# Patient Record
Sex: FEMALE | Race: WHITE | NOT HISPANIC OR LATINO | Employment: UNEMPLOYED | URBAN - METROPOLITAN AREA
[De-identification: names, ages, dates, MRNs, and addresses within clinical notes are randomized per-mention and may not be internally consistent; named-entity substitution may affect disease eponyms.]

---

## 2018-01-09 NOTE — MISCELLANEOUS
Message   Recorded as Task   Date: 03/22/2016 09:06 AM, Created By: Sheree Mcclain   Task Name: Call Back   Assigned To: GREGORY GYN,Team   Regarding Patient: Phu Jameson, Status: In Progress   CommentMaire Draft - 22 Mar 2016 9:06 AM     TASK CREATED  Caller: Self; [phone] (Home); [phone] (Work)  pt thinks she has another infection was put on meds and it never went away does she need an apt at this time [address]  22 Mar 2016 9:36 AM     TASK IN PROGRESS   Tonie,Carroll - 22 Mar 2016 9:46 AM     TASK EDITED  dr Chante Nunez notified  - please advise   TonieCarroll - 22 Mar 2016 9:46 AM     TASK REASSIGNED: Previously Assigned To Janine Maze - 22 Mar 2016 10:32 AM     TASK REPLIED TO: Previously Assigned To Jamari Berry  Rx done for Cleocin Vaginal, tell patient   TonieCarroll - 22 Mar 2016 11:15 AM     TASK EDITED  called pt- informed of cleocin vag sup- pt to call pharm prior to picking up  Active Problems    1  Bacterial vaginosis (616 10,041 9) (N76 0,B96 89)   2  Vaginitis (616 10) (N76 0)    Current Meds   1  Cleocin 100 MG Vaginal Suppository; INSERT 1 SUPPOSITORY INTRAVAGINALLY AT   BEDTIME NIGHTLY; Therapy: [id number] to (Eli Villalobos)  Requested for: 22Mar2016; Last   Rx:22Mar2016 Ordered   2  MetroNIDAZOLE 500 MG Oral Tablet; sig 1 tab bid x 7 days; Therapy: [id number] to (Eli Villalobos)  Requested for: 88PNB1455; Last   Rx:01Mar2016 Ordered    Allergies    1   No Known Drug Allergies    Signatures   Electronically signed by : Konrad Salazar RN; Mar 22 2016 11:15AM EST                       (Author)

## 2018-01-10 NOTE — MISCELLANEOUS
Message   Recorded as Task   Date: 03/22/2016 02:40 PM, Created By: Valeri Hui   Task Name: Follow Up   Assigned To: Abhishek Heller   Regarding Patient: Deedee Tineo, Status: In Progress   Comment:    Valeri Hui - 22 Mar 2016 2:40 PM     TASK CREATED    pharm called the cleocin isnt covered by ins,will try a preauth first   Valeri Hui - 22 Mar 2016 2:47 PM     TASK EDITED  MEDICATION IS NOT ON PTS FORMULARY CAN WE TRY ANOTHER RX? Joan Castillone - 22 Mar 2016 2:54 PM     TASK IN PROGRESS   Merlyn Castillo - 22 Mar 2016 3:01 PM     TASK EDITED  pt  will call ins  to see what rx is covered    metronidazole made her feel sick and did not take away the symptoms   await pts callback and then send to vg for alternate rx   JonathanMerlyn - 22 Mar 2016 3:43 PM     TASK EDITED  pt spoke with ins    they will cover clindamycin   req new rx   JnoathanMerlyn - 22 Mar 2016 3:43 PM     TASK REASSIGNED: Previously Assigned To GREGORY GYN,Team  to vg for rx   Jamari Berry - 25 Mar 2016 2:35 PM     TASK REPLIED TO: Previously Assigned To Jamari Berry  Rx of Clindamycin was issued   Merlyn Castillo - 25 Mar 2016 2:40 PM     TASK EDITED  pt inf rx to ehr        Active Problems    1  Bacterial vaginosis (616 10,041 9) (N76 0,B96 89)   2  Vaginitis (616 10) (N76 0)    Current Meds   1  Cleocin 100 MG Vaginal Suppository; INSERT 1 SUPPOSITORY INTRAVAGINALLY AT   BEDTIME NIGHTLY; Therapy: [id number] to (Barrera Canseco)  Requested for: 22Mar2016; Last   Rx:22Mar2016 Ordered   2  Clindamycin Phosphate 2 % Vaginal Cream; INSERT 1 APPLICATORFUL   INTRAVAGINALLY AT BEDTIME NIGHTLY; Therapy: [id number] to (Reyna Boland)  Requested for: 25Mar2016; Last   Rx:25Mar2016 Ordered   3  MetroNIDAZOLE 500 MG Oral Tablet; sig 1 tab bid x 7 days; Therapy: 49ECX0190 to (Roxann Macias)  Requested for: 19XLU3458; Last   Rx:01Mar2016 Ordered    Allergies    1   No Known Drug Allergies    Signatures   Electronically signed by : Lizet Feldman, ; Mar 25 2016  2:40PM EST                       (Author)

## 2018-01-13 NOTE — MISCELLANEOUS
Message   Recorded as Task   Date: 03/22/2016 02:40 PM, Created By: Heydi Vasquez   Task Name: Follow Up   Assigned To: Opal Lopez   Regarding Patient: Saad Hardin, Status: In Progress   Comment:    Heydi Vasquez - 22 Mar 2016 2:40 PM     TASK CREATED    pharm called the cleocin isnt covered by ins,will try a preauth first   Heydi Vasquez - 22 Mar 2016 2:47 PM     TASK EDITED  MEDICATION IS NOT ON PTS FORMULARY CAN WE TRY ANOTHER RX? Merlyn Castillo - 22 Mar 2016 2:54 PM     TASK IN PROGRESS   Merlyn Castillo - 22 Mar 2016 3:01 PM     TASK EDITED  pt  will call ins  to see what rx is covered    metronidazole made her feel sick and did not take away the symptoms   await pts callback and then send to vg for alternate rx   JonathanMerlyn - 22 Mar 2016 3:43 PM     TASK EDITED  pt spoke with ins    they will cover clindamycin   req new rx        Active Problems    1  Bacterial vaginosis (616 10,041 9) (N76 0,B96 89)   2  Vaginitis (616 10) (N76 0)    Current Meds   1  Cleocin 100 MG Vaginal Suppository; INSERT 1 SUPPOSITORY INTRAVAGINALLY AT   BEDTIME NIGHTLY; Therapy: [id number] to (Becky Farley)  Requested for: 22Mar2016; Last   Rx:22Mar2016 Ordered   2  MetroNIDAZOLE 500 MG Oral Tablet; sig 1 tab bid x 7 days; Therapy: 34YEU3742 to (Neal Oro)  Requested for: 55ASJ3701; Last   Rx:01Mar2016 Ordered    Allergies    1   No Known Drug Allergies    Signatures   Electronically signed by : Jocelyn Sandhu, ; Mar 22 2016  3:43PM EST                       (Author)

## 2018-01-16 NOTE — MISCELLANEOUS
Message   Recorded as Task   Date: 03/22/2016 09:06 AM, Created By: Memo Taylor   Task Name: Call Back   Assigned To: GREGORY GYN,Team   Regarding Patient: Luiz Oneal, Status: In Progress   DenzelEdy Ish - 22 Mar 2016 9:06 AM     TASK CREATED  Caller: Self; [phone] (Home); [phone] (Work)  pt thinks she has another infection was put on meds and it never went away does she need an apt at this time [address]  22 Mar 2016 9:36 AM     TASK IN PROGRESS   TonieJan - 22 Mar 2016 9:46 AM     TASK EDITED  dr Soliz Other notified  - please advise        Active Problems    1  Bacterial vaginosis (616 10,041 9) (N76 0,B96 89)   2  Vaginitis (616 10) (N76 0)    Current Meds   1  MetroNIDAZOLE 500 MG Oral Tablet; sig 1 tab bid x 7 days; Therapy: 66HKT1633 to (Lucía Aguilar)  Requested for: 24HAT7813; Last   Rx:01Mar2016 Ordered    Allergies    1   No Known Drug Allergies    Signatures   Electronically signed by : Blanca Flores RN; Mar 22 2016  9:46AM EST                       (Author)

## 2024-05-06 ENCOUNTER — HOSPITAL ENCOUNTER (EMERGENCY)
Facility: HOSPITAL | Age: 39
Discharge: HOME | End: 2024-05-06
Attending: EMERGENCY MEDICINE
Payer: MEDICARE

## 2024-05-06 VITALS
HEART RATE: 96 BPM | OXYGEN SATURATION: 100 % | BODY MASS INDEX: 24.1 KG/M2 | TEMPERATURE: 98 F | RESPIRATION RATE: 18 BRPM | SYSTOLIC BLOOD PRESSURE: 128 MMHG | DIASTOLIC BLOOD PRESSURE: 95 MMHG | WEIGHT: 138.23 LBS

## 2024-05-06 DIAGNOSIS — R19.7 DIARRHEA: Primary | ICD-10-CM

## 2024-05-06 LAB
ALBUMIN SERPL BCP-MCNC: 4.5 G/DL (ref 3.5–5)
ALP SERPL-CCNC: 41 U/L (ref 34–104)
ALT SERPL W P-5'-P-CCNC: 9 U/L (ref 7–52)
ANION GAP SERPL CALCULATED.3IONS-SCNC: 8 MMOL/L (ref 4–13)
AST SERPL W P-5'-P-CCNC: 12 U/L (ref 13–39)
BACTERIA UR QL AUTO: ABNORMAL /HPF
BASOPHILS # BLD AUTO: 0.05 THOUSANDS/ÂΜL (ref 0–0.1)
BASOPHILS NFR BLD AUTO: 1 % (ref 0–1)
BILIRUB SERPL-MCNC: 0.36 MG/DL (ref 0.2–1)
BILIRUB UR QL STRIP: NEGATIVE
BUN SERPL-MCNC: 7 MG/DL (ref 5–25)
CALCIUM SERPL-MCNC: 9.4 MG/DL (ref 8.4–10.2)
CHLORIDE SERPL-SCNC: 104 MMOL/L (ref 96–108)
CLARITY UR: CLEAR
CO2 SERPL-SCNC: 26 MMOL/L (ref 21–32)
COLOR UR: COLORLESS
CREAT SERPL-MCNC: 0.69 MG/DL (ref 0.6–1.3)
EOSINOPHIL # BLD AUTO: 0.1 THOUSAND/ÂΜL (ref 0–0.61)
EOSINOPHIL NFR BLD AUTO: 2 % (ref 0–6)
ERYTHROCYTE [DISTWIDTH] IN BLOOD BY AUTOMATED COUNT: 19.5 % (ref 11.6–15.1)
EXT FECAL OCCULT BLOOD SCREEN: POSITIVE
EXT PREGNANCY TEST URINE: NEGATIVE
EXT. CONTROL: ABNORMAL
EXT. CONTROL: NORMAL
GFR SERPL CREATININE-BSD FRML MDRD: 110 ML/MIN/1.73SQ M
GLUCOSE SERPL-MCNC: 90 MG/DL (ref 65–140)
GLUCOSE UR STRIP-MCNC: NEGATIVE MG/DL
HCT VFR BLD AUTO: 37.5 % (ref 34.8–46.1)
HGB BLD-MCNC: 11.6 G/DL (ref 11.5–15.4)
HGB UR QL STRIP.AUTO: ABNORMAL
IMM GRANULOCYTES # BLD AUTO: 0.01 THOUSAND/UL (ref 0–0.2)
IMM GRANULOCYTES NFR BLD AUTO: 0 % (ref 0–2)
KETONES UR STRIP-MCNC: ABNORMAL MG/DL
LEUKOCYTE ESTERASE UR QL STRIP: NEGATIVE
LIPASE SERPL-CCNC: 40 U/L (ref 11–82)
LYMPHOCYTES # BLD AUTO: 1.61 THOUSANDS/ÂΜL (ref 0.6–4.47)
LYMPHOCYTES NFR BLD AUTO: 33 % (ref 14–44)
MCH RBC QN AUTO: 24 PG (ref 26.8–34.3)
MCHC RBC AUTO-ENTMCNC: 30.9 G/DL (ref 31.4–37.4)
MCV RBC AUTO: 78 FL (ref 82–98)
MONOCYTES # BLD AUTO: 0.35 THOUSAND/ÂΜL (ref 0.17–1.22)
MONOCYTES NFR BLD AUTO: 7 % (ref 4–12)
MUCOUS THREADS UR QL AUTO: ABNORMAL
NEUTROPHILS # BLD AUTO: 2.83 THOUSANDS/ÂΜL (ref 1.85–7.62)
NEUTS SEG NFR BLD AUTO: 57 % (ref 43–75)
NITRITE UR QL STRIP: NEGATIVE
NON-SQ EPI CELLS URNS QL MICRO: ABNORMAL /HPF
NRBC BLD AUTO-RTO: 0 /100 WBCS
PH UR STRIP.AUTO: 5.5 [PH]
PLATELET # BLD AUTO: 321 THOUSANDS/UL (ref 149–390)
PMV BLD AUTO: 10.6 FL (ref 8.9–12.7)
POTASSIUM SERPL-SCNC: 3.6 MMOL/L (ref 3.5–5.3)
PROT SERPL-MCNC: 7.1 G/DL (ref 6.4–8.4)
PROT UR STRIP-MCNC: NEGATIVE MG/DL
RBC # BLD AUTO: 4.84 MILLION/UL (ref 3.81–5.12)
RBC #/AREA URNS AUTO: ABNORMAL /HPF
SODIUM SERPL-SCNC: 138 MMOL/L (ref 135–147)
SP GR UR STRIP.AUTO: 1.01 (ref 1–1.03)
TSH SERPL DL<=0.05 MIU/L-ACNC: 0.74 UIU/ML (ref 0.45–4.5)
UROBILINOGEN UR STRIP-ACNC: <2 MG/DL
WBC # BLD AUTO: 4.95 THOUSAND/UL (ref 4.31–10.16)
WBC #/AREA URNS AUTO: ABNORMAL /HPF

## 2024-05-06 PROCEDURE — 80053 COMPREHEN METABOLIC PANEL: CPT | Performed by: EMERGENCY MEDICINE

## 2024-05-06 PROCEDURE — 84443 ASSAY THYROID STIM HORMONE: CPT | Performed by: EMERGENCY MEDICINE

## 2024-05-06 PROCEDURE — 36415 COLL VENOUS BLD VENIPUNCTURE: CPT

## 2024-05-06 PROCEDURE — 81001 URINALYSIS AUTO W/SCOPE: CPT

## 2024-05-06 PROCEDURE — 85025 COMPLETE CBC W/AUTO DIFF WBC: CPT | Performed by: EMERGENCY MEDICINE

## 2024-05-06 PROCEDURE — 99284 EMERGENCY DEPT VISIT MOD MDM: CPT | Performed by: EMERGENCY MEDICINE

## 2024-05-06 PROCEDURE — 81025 URINE PREGNANCY TEST: CPT

## 2024-05-06 PROCEDURE — 83690 ASSAY OF LIPASE: CPT | Performed by: EMERGENCY MEDICINE

## 2024-05-06 PROCEDURE — 99284 EMERGENCY DEPT VISIT MOD MDM: CPT

## 2024-05-06 PROCEDURE — 96360 HYDRATION IV INFUSION INIT: CPT

## 2024-05-06 PROCEDURE — 96361 HYDRATE IV INFUSION ADD-ON: CPT

## 2024-05-06 RX ADMIN — SODIUM CHLORIDE 1000 ML: 0.9 INJECTION, SOLUTION INTRAVENOUS at 15:00

## 2024-05-06 NOTE — ED ATTENDING ATTESTATION
5/6/2024  I, Alex Marie MD, saw and evaluated the patient. I have discussed the patient with the resident/non-physician practitioner and agree with the resident's/non-physician practitioner's findings, Plan of Care, and MDM as documented in the resident's/non-physician practitioner's note, except where noted. All available labs and Radiology studies were reviewed.  I was present for key portions of any procedure(s) performed by the resident/non-physician practitioner and I was immediately available to provide assistance.       At this point I agree with the current assessment done in the Emergency Department.  I have conducted an independent evaluation of this patient a history and physical is as follows:    History    Patient is a 38-year-old female, with a history significant for IBS per my review of medical record, who presents to the ED today for evaluation of a 2 to 3-month history of semisolid diarrhea.  Patient reports generalized weakness, estimated 20 pound weight loss, decreased p.o. intake, cold intolerance.  Patient also had an episode of abdominal cramping in February when her symptoms began but stated that this is now resolved and she has no pain.  Patient also denies fever, urinary symptoms, chest pain, dyspnea, history of A-fib, recent international travel, recent antibiotic use/hospitalizations.  Patient has attempted Imodium to remit her symptoms.  Eating exacerbates her symptoms.  Notably, patient also discontinued her anxiety medications in February.    Patient's , present in room and friend collateral history, states patient is not confused.    Patient is without other concerns at this time.     ROS  Patient denies: Fever; dysphagia; vision change; chest pain; dyspnea; abdominal pain; polyuria; dysuria; rash; weakness; numbness; difficulty walking; confusion; rash.     Physical Exam    GENERAL APPEARANCE: NAD  NEURO/Psych: Patient is speaking clearly in complete sentences.   Patient is answering appropriately and able follow commands.  Patient is moving all four extremities spontaneously.  No facial droop.  Tongue midline.  Anxious affect  HEENT: PERRL, Moist mucous membranes, external ears normal, nose normal  Neck: No cervical adenopathy  CV: RRR. No murmurs, rubs, gallops  LUNGS: Clear to auscultation: No wheezes, stridor, rhonchi, rales  GI: Abdomen non-distended. Soft. Non-tender and without rebound or guarding   : Deferred at this time  MSK: No deformity.   Skin: Warm and dry  Capillary refill: <2 seconds    Patient is currently afebrile and hemodynamically stable    Assessment/Plan/MDM  Diarrhea, generalized weakness, decreased p.o. intake  -Concern for IBS versus electrolyte abnormality versus anemia versus anxiety versus RADHA versus dehydration.  Also consider thyroid dysregulation, pancreatitis.  Overall low suspicion for infectious diarrhea/C. difficile based on history physical exam.  Given prior normal inflammatory markers, doubt inflammatory bowel disease.  -Will investigate with thyroid studies, stool studies, lipase, TSH, CBC, CMP  -Will manage with fluid bolus, referral to GI, further based upon workup      ED Course  ED Course as of 05/06/24 2133   Mon May 06, 2024   1505 LIPASE: 40  WNL   1505 Hemoglobin: 11.6  WNL   1505 Sodium: 138  WNL   1506 Potassium: 3.6  WNL   1506 WBC: 4.95  WNL   1618 TSH 3RD GENERATON: 0.739  WNL   1618 PREGNANCY TEST URINE: Negative  WNL         Critical Care Time  Procedures

## 2024-05-06 NOTE — ED PROVIDER NOTES
History  Chief Complaint   Patient presents with    Diarrhea     Pt reports diarrhea since feb, worsening. Decrease in appetite.      Patient is a 38-year-old female with history of irritable bowel syndrome and anxiety that presents to the emergency department with over 1 month of daily watery diarrhea.  She reports 6-7 episodes of diarrhea a day with occasional bright red blood and occasionally dark.  She reports having episodes of this in the past but these have never lasted longer than 2 weeks.  She reports back in February of this year she had 2 weeks of abdominal pain and cramping, which she associated with her irritable bowel syndrome.  She reports the pain has completely resolved, however ever since then she has had daily diarrhea.  She reports anytime she eats she gets some mild cramping and then has to have a bowel movement.  She denies any recent antibiotics, hospitalizations, recent travel, recent questionable foods or camping, and has no known history of atrial fibrillation.  Notably, patient was taking an anxiety medication, but reports that back in February, when she was having so much abdominal cramping, she stopped taking her anxiety med.  She denies any urinary symptoms.  She reports that she has a known external hemorrhoid and thinks that the bright red blood is from that.  She denies fevers, chest pain, abnormal vaginal discharge or bleeding, or urgency, frequency or dysuria.        None       Past Medical History:   Diagnosis Date    IBS (irritable bowel syndrome)        Past Surgical History:   Procedure Laterality Date    SHOULDER ARTHROSCOPY         History reviewed. No pertinent family history.  I have reviewed and agree with the history as documented.    E-Cigarette/Vaping    E-Cigarette Use Current Some Day User      E-Cigarette/Vaping Substances     Social History     Tobacco Use    Smoking status: Never    Smokeless tobacco: Never   Vaping Use    Vaping status: Some Days   Substance Use  Topics    Alcohol use: Yes     Comment: socially    Drug use: Never        Review of Systems   Constitutional:  Negative for chills and fever.   HENT:  Negative for congestion, ear pain and sore throat.    Eyes:  Negative for pain and visual disturbance.   Respiratory:  Negative for cough and shortness of breath.    Cardiovascular:  Negative for chest pain and palpitations.   Gastrointestinal:  Positive for blood in stool, diarrhea and nausea (mild, intermittent). Negative for abdominal pain, constipation, rectal pain and vomiting.   Genitourinary:  Negative for dysuria, hematuria, urgency, vaginal bleeding and vaginal discharge.   Musculoskeletal:  Negative for arthralgias and back pain.   Skin:  Negative for color change and rash.   Neurological:  Negative for dizziness, seizures, syncope, light-headedness and headaches.   All other systems reviewed and are negative.      Physical Exam  ED Triage Vitals [05/06/24 1402]   Temperature Pulse Respirations Blood Pressure SpO2   98 °F (36.7 °C) 96 18 128/95 100 %      Temp Source Heart Rate Source Patient Position - Orthostatic VS BP Location FiO2 (%)   Oral Monitor Sitting Right arm --      Pain Score       --             Orthostatic Vital Signs  Vitals:    05/06/24 1402   BP: 128/95   Pulse: 96   Patient Position - Orthostatic VS: Sitting       Physical Exam  Vitals and nursing note reviewed. Exam conducted with a chaperone present.   Constitutional:       General: She is not in acute distress.     Appearance: Normal appearance. She is well-developed. She is not ill-appearing.   HENT:      Head: Normocephalic and atraumatic.      Right Ear: External ear normal.      Left Ear: External ear normal.      Mouth/Throat:      Pharynx: Oropharynx is clear. No oropharyngeal exudate or posterior oropharyngeal erythema.   Eyes:      General:         Right eye: No discharge.         Left eye: No discharge.      Extraocular Movements: Extraocular movements intact.       Conjunctiva/sclera: Conjunctivae normal.   Cardiovascular:      Rate and Rhythm: Normal rate and regular rhythm.      Pulses: Normal pulses.      Heart sounds: Normal heart sounds. No murmur heard.  Pulmonary:      Effort: Pulmonary effort is normal. No respiratory distress.      Breath sounds: Normal breath sounds. No wheezing, rhonchi or rales.   Abdominal:      General: Abdomen is flat. Bowel sounds are decreased.      Palpations: Abdomen is soft.      Tenderness: There is no abdominal tenderness. There is no guarding or rebound.   Genitourinary:     Rectum: Guaiac result positive. External hemorrhoid present. No mass, tenderness or anal fissure. Normal anal tone.   Musculoskeletal:         General: No swelling or deformity. Normal range of motion.      Cervical back: Neck supple. No tenderness.   Skin:     General: Skin is warm and dry.      Capillary Refill: Capillary refill takes less than 2 seconds.   Neurological:      Mental Status: She is alert.         ED Medications  Medications   sodium chloride 0.9 % bolus 1,000 mL (0 mL Intravenous Stopped 5/6/24 1705)       Diagnostic Studies  Results Reviewed       Procedure Component Value Units Date/Time    Urine Microscopic [[id number]]  (Abnormal) Collected: 05/06/24 1601    Lab Status: Final result Specimen: Urine, Clean Catch Updated: 05/06/24 1624     RBC, UA 1-2 /hpf      WBC, UA 1-2 /hpf      Epithelial Cells Occasional /hpf      Bacteria, UA Occasional /hpf      MUCUS THREADS Occasional    UA w Reflex to Microscopic w Reflex to Culture [[id number]]  (Abnormal) Collected: 05/06/24 1601    Lab Status: Final result Specimen: Urine, Clean Catch Updated: 05/06/24 1622     Color, UA Colorless     Clarity, UA Clear     Specific Gravity, UA 1.007     pH, UA 5.5     Leukocytes, UA Negative     Nitrite, UA Negative     Protein, UA Negative mg/dl      Glucose, UA Negative mg/dl      Ketones, UA 40 (2+) mg/dl      Urobilinogen, UA <2.0 mg/dl      Bilirubin, UA Negative      Occult Blood, UA Moderate    TSH, 3rd generation with Free T4 reflex [[id number]]  (Normal) Collected: 05/06/24 1410    Lab Status: Final result Specimen: Blood from Arm, Right Updated: 05/06/24 1612     TSH 3RD GENERATON 0.739 uIU/mL     POCT pregnancy, urine [[id number]]  (Normal) Resulted: 05/06/24 1601    Lab Status: Final result Updated: 05/06/24 1601     EXT Preg Test, Ur Negative     Control Valid    Clostridium difficile toxin by PCR with EIA [[id number]]     Lab Status: No result Specimen: Stool from Per Rectum     Stool Enteric Bacterial Panel by PCR [[id number]]     Lab Status: No result Specimen: Stool from Rectum     POCT occult blood stool [[id number]]  (Abnormal) Collected: 05/06/24 1503    Lab Status: In process Specimen: Stool Updated: 05/06/24 1503     EXT Fecal Occult Blood Positive     Control Valid    Comprehensive metabolic panel [[id number]]  (Abnormal) Collected: 05/06/24 1410    Lab Status: Final result Specimen: Blood from Arm, Right Updated: 05/06/24 1459     Sodium 138 mmol/L      Potassium 3.6 mmol/L      Chloride 104 mmol/L      CO2 26 mmol/L      ANION GAP 8 mmol/L      BUN 7 mg/dL      Creatinine 0.69 mg/dL      Glucose 90 mg/dL      Calcium 9.4 mg/dL      AST 12 U/L      ALT 9 U/L      Alkaline Phosphatase 41 U/L      Total Protein 7.1 g/dL      Albumin 4.5 g/dL      Total Bilirubin 0.36 mg/dL      eGFR 110 ml/min/1.73sq m     Narrative:      National Kidney Disease Foundation guidelines for Chronic Kidney Disease (CKD):     Stage 1 with normal or high GFR (GFR > 90 mL/min/1.73 square meters)    Stage 2 Mild CKD (GFR = 60-89 mL/min/1.73 square meters)    Stage 3A Moderate CKD (GFR = 45-59 mL/min/1.73 square meters)    Stage 3B Moderate CKD (GFR = 30-44 mL/min/1.73 square meters)    Stage 4 Severe CKD (GFR = 15-29 mL/min/1.73 square meters)    Stage 5 End Stage CKD (GFR <15 mL/min/1.73 square meters)  Note: GFR calculation is accurate only with a steady state creatinine    Lipase  [[id number]]  (Normal) Collected: 05/06/24 1410    Lab Status: Final result Specimen: Blood from Arm, Right Updated: 05/06/24 1459     Lipase 40 u/L     CBC and differential [[id number]]  (Abnormal) Collected: 05/06/24 1410    Lab Status: Final result Specimen: Blood from Arm, Right Updated: 05/06/24 1447     WBC 4.95 Thousand/uL      RBC 4.84 Million/uL      Hemoglobin 11.6 g/dL      Hematocrit 37.5 %      MCV 78 fL      MCH 24.0 pg      MCHC 30.9 g/dL      RDW 19.5 %      MPV 10.6 fL      Platelets 321 Thousands/uL      nRBC 0 /100 WBCs      Segmented % 57 %      Immature Grans % 0 %      Lymphocytes % 33 %      Monocytes % 7 %      Eosinophils Relative 2 %      Basophils Relative 1 %      Absolute Neutrophils 2.83 Thousands/µL      Absolute Immature Grans 0.01 Thousand/uL      Absolute Lymphocytes 1.61 Thousands/µL      Absolute Monocytes 0.35 Thousand/µL      Eosinophils Absolute 0.10 Thousand/µL      Basophils Absolute 0.05 Thousands/µL                    No orders to display         Procedures  Procedures      ED Course                             SBIRT 22yo+      Flowsheet Row Most Recent Value   Initial Alcohol Screen: US AUDIT-C     1. How often do you have a drink containing alcohol? 0 Filed at: 05/06/2024 1444   2. How many drinks containing alcohol do you have on a typical day you are drinking?  0 Filed at: 05/06/2024 1444   3b. FEMALE Any Age, or MALE 65+: How often do you have 4 or more drinks on one occassion? 0 Filed at: 05/06/2024 1444   Audit-C Score 0 Filed at: 05/06/2024 1444   VINICIUS: How many times in the past year have you...    Used an illegal drug or used a prescription medication for non-medical reasons? Never Filed at: 05/06/2024 1444                  Medical Decision Making  38F here with several weeks of diarrhea.     DDx including but not limited to: food poisoning, viral illness, colitis, enteritis, metabolic abnormality, IBS, IBD, ileus, bowel obstruction, appendicitis, pancreatitis,  hepatitis, mesenteric adenitis, mesenteric ischemia, toxic megacolon, cholecystitis, biliary colic, pyelonephritis, UTI, renal colic.      No recent antibiotic use or hospitalizations-doubt C. difficile.    CBC, CMP, lipase, TSH, UA, point-of-care pregnancy test all within normal limits.    Given normal laboratory evaluation, and patient stopping anxiety medication use at symptom onset, suspect IBS and recommend follow-up with gastroenterology.    Although patient had positive Hemoccult, suspect this is due to hemorrhoid.  Patient's hemoglobin is normal.    In the absence of additional concerning findings on physical exam or laboratory evaluation patient is appropriate for discharge at this time.  Patient provided with informational handout that discusses symptom management and reasons to return to the emergency department.  Return precautions are discussed and the patient and/or family demonstrate understanding of the plan.  Their questions are all answered to their satisfaction and the patient is discharged.      Amount and/or Complexity of Data Reviewed  Labs: ordered.          Disposition  Final diagnoses:   Diarrhea     Time reflects when diagnosis was documented in both MDM as applicable and the Disposition within this note       Time User Action Codes Description Comment    5/6/2024  4:37 PM Ugo Nelson Add [R19.7] Diarrhea           ED Disposition       ED Disposition   Discharge    Condition   Stable    Date/Time   Mon May 6, 2024 1637    Comment   Angie Aamya discharge to home/self care.                   Follow-up Information       Follow up With Specialties Details Why Contact Info Additional Information    [facility] [place] [facility] Schedule an appointment as soon as possible for a visit   [address] [facility]  [address]  [facility] [id number] [phone] [facility] [place], [address] [facility] [address] [address], [facility], [place], [place],  [address]   [phone]            There are no discharge medications for this patient.        PDMP Review       None             ED Provider  Attending physically available and evaluated Angie Amaya. I managed the patient along with the ED Attending.    Electronically Signed by           Ugo Nelson DO  05/07/24 0001

## 2024-05-07 ENCOUNTER — APPOINTMENT (OUTPATIENT)
Dept: LAB | Facility: AMBULARY SURGERY CENTER | Age: 39
End: 2024-05-07
Payer: MEDICARE

## 2024-05-07 ENCOUNTER — OFFICE VISIT (OUTPATIENT)
Dept: GASTROENTEROLOGY | Facility: AMBULARY SURGERY CENTER | Age: 39
End: 2024-05-07
Payer: MEDICARE

## 2024-05-07 VITALS
WEIGHT: 138.3 LBS | OXYGEN SATURATION: 98 % | HEIGHT: 63 IN | SYSTOLIC BLOOD PRESSURE: 110 MMHG | DIASTOLIC BLOOD PRESSURE: 68 MMHG | BODY MASS INDEX: 24.5 KG/M2 | HEART RATE: 69 BPM

## 2024-05-07 DIAGNOSIS — R19.7 DIARRHEA: ICD-10-CM

## 2024-05-07 DIAGNOSIS — R10.13 DYSPEPSIA: ICD-10-CM

## 2024-05-07 DIAGNOSIS — R63.4 WEIGHT LOSS, ABNORMAL: ICD-10-CM

## 2024-05-07 DIAGNOSIS — R19.7 DIARRHEA: Primary | ICD-10-CM

## 2024-05-07 DIAGNOSIS — Z83.719 FAMILY HISTORY OF COLONIC POLYPS: ICD-10-CM

## 2024-05-07 DIAGNOSIS — K62.5 RECTAL BLEEDING: ICD-10-CM

## 2024-05-07 LAB
CRP SERPL QL: <1 MG/L
IGA SERPL-MCNC: 249 MG/DL (ref 66–433)

## 2024-05-07 PROCEDURE — 86140 C-REACTIVE PROTEIN: CPT

## 2024-05-07 PROCEDURE — 36415 COLL VENOUS BLD VENIPUNCTURE: CPT

## 2024-05-07 PROCEDURE — 82784 ASSAY IGA/IGD/IGG/IGM EACH: CPT

## 2024-05-07 PROCEDURE — 86258 DGP ANTIBODY EACH IG CLASS: CPT

## 2024-05-07 PROCEDURE — 99204 OFFICE O/P NEW MOD 45 MIN: CPT | Performed by: PHYSICIAN ASSISTANT

## 2024-05-07 PROCEDURE — 86364 TISS TRNSGLTMNASE EA IG CLAS: CPT

## 2024-05-07 RX ORDER — GABAPENTIN 800 MG/1
800 TABLET ORAL 3 TIMES DAILY
COMMUNITY

## 2024-05-07 RX ORDER — DICYCLOMINE HYDROCHLORIDE 10 MG/1
10 CAPSULE ORAL 3 TIMES DAILY PRN
Qty: 90 CAPSULE | Refills: 0 | Status: SHIPPED | OUTPATIENT
Start: 2024-05-07

## 2024-05-07 NOTE — ASSESSMENT & PLAN NOTE
Patient reports her father had colon polyps    -Will assess for adenomatous polyps at the time of colonoscopy

## 2024-05-07 NOTE — LETTER
May 9, 2024     Poly Radford, FAN  [address] [facility] [address]    Patient: Angie Amaya   YOB: 1985   Date of Visit: 5/7/2024       Dear Dr. Radford:    Thank you for referring Angie Amaya to me for evaluation. Below are my notes for this consultation.    If you have questions, please do not hesitate to call me. I look forward to following your patient along with you.         Sincerely,        Carmencita Gonzalez PA-C        CC: No Recipients    Carmencita Gonzalez PA-C  5/7/2024  1:03 PM  Signed  [facility] - Outpatient Consultation  Angie Amaya 38 y.o. female MRN: [id number]  Encounter: [id number]          ASSESSMENT AND PLAN:      1. Diarrhea  Change in bowel habits characterized by frequent postprandial diarrhea associated with abdominal pain and gurgling over the last approximately 3 months.  May represent exacerbation of IBS however cannot exclude celiac disease or early inflammatory bowel disease at this time    -Plan for colonoscopy at the same time as EGD    -Procedure was explained in detail to the patient at this time including associated risks and benefits, risks including but not limited to infection, perforation and bleeding    -Prescription and instructions provided for colonoscopy prep    -In the meantime we will check inflammatory markers including C-reactive protein and stool calprotectin    -Will also check stool infectious studies including enteric panel, C. difficile, and ova and parasites    -Will also check celiac disease antibody profile, as well as duodenal biopsies at the time of EGD    -Additionally we will trial Bentyl 10 mg 3 times daily as needed for presumed bowel spasm, I instructed patient in its use and about common side effects      2.  Family history of colon polyps  Patient reports her father had colon polyps    -Will assess for adenomatous polyps at the time of colonoscopy    3.  Rectal bleeding  Patient  had endorsed intermittent bright red blood per rectum and mucus per rectum which appears most likely hemorrhoidal but rule out inflammatory bowel disease or malignancy    -Again we will plan for colonoscopy at the same time as EGD    4.  Weight loss, abnormal  Patient reports unintentional weight loss of about 20 pounds over the last 3 months, possibly owing to decreased oral intake in the setting of postprandial GI symptoms described elsewhere in this report, rule out underlying GI malignancy    -Again we will plan for EGD and colonoscopy concurrently    -Consider CT scan of the abdomen and pelvis if weight loss progresses and endoscopic findings/other workup are abnormal, I encouraged patient to continue to monitor her weight once weekly    5.  Dyspepsia  Postprandial gurgling, generalized abdominal pain, may represent functional dyspepsia/bowel spasm in the setting of IBS but rule out peptic ulcer disease, gastritis    -Plan for EGD at the same time as colonoscopy, will also monitor response to Bentyl as described    -May consider PPI therapy if EGD is revealing for any significant inflammatory pathology    ______________________________________________________________________    HPI: 38-year-old female with history of anxiety who presents for evaluation, she was seen in the emergency room yesterday afternoon with complaint of frequent diarrhea for over a month, she says symptoms generally started in February, as of recently has been having about 6-7 episodes of diarrhea a day with occasional bright red blood per rectum.  She said in the past used to experience episodes like this but generally they would not last longer than about 2 weeks.  In February when her current symptoms started she says she was also experiencing abdominal pain and cramping which resolved after about 2 weeks, however the diarrhea has persisted.  At this point she does still get mild cramping postprandially, followed by feeling of having to  have a bowel movement.  She had endorsed stopping taking a medication for her anxiety back in February when she was having the abdominal cramping.    Labs in the emergency room yesterday showed decreased MCV of 78 but normal hemoglobin of 11.6, otherwise CBC, CMP appear unremarkable.  Stool occult blood was apparently checked and found positive.  Orders were placed for stool C. difficile and enteric panel but these tests have not been run yet.    The patient tells me that she had similar pains in April 2023 which lasted for a few weeks before generally subsiding.  She says she was generally doing relatively well again until February 2024 when her current symptoms began.  She experiences a lot of gurgling sensation after eating, and again postprandial diarrhea.  She believes she is lost about 20 pounds since February.  She does not believe she has ever been trialed on any antispasmodics for IBS, she tells me she was diagnosed with IBS about 15 years ago, having had endoscopic evaluations at the time, she also had EGD and colonoscopy done a few years ago at an outside GI network which were reportedly unremarkable.  She tells me she had a gastric emptying study in the last couple of years that was also negative.  She has not had any abdominal surgeries including cholecystectomy.  She reports her father had colon polyps.  She reports she has been noticing mucus in her stools and also a recent change in her stool odor, fishy in quality.      REVIEW OF SYSTEMS:    CONSTITUTIONAL: Denies any fever, chills, rigors, and weight loss.  HEENT: No earache or tinnitus. Denies hearing loss or visual disturbances.  CARDIOVASCULAR: No chest pain or palpitations.   RESPIRATORY: Denies any cough, hemoptysis, shortness of breath or dyspnea on exertion.  GASTROINTESTINAL: As noted in the History of Present Illness.   GENITOURINARY: No problems with urination. Denies any hematuria or dysuria.  NEUROLOGIC: No dizziness or vertigo, denies  "headaches.   MUSCULOSKELETAL: Denies any muscle or joint pain.   SKIN: Denies skin rashes or itching.   ENDOCRINE: Denies excessive thirst. Denies intolerance to heat or cold.  PSYCHOSOCIAL: Denies depression or anxiety. Denies any recent memory loss.       Historical Information  Past Medical History:   Diagnosis Date   • IBS (irritable bowel syndrome)      Past Surgical History:   Procedure Laterality Date   • SHOULDER ARTHROSCOPY       Social History  Social History     Substance and Sexual Activity   Alcohol Use Yes    Comment: socially     Social History     Substance and Sexual Activity   Drug Use Never     Social History     Tobacco Use   Smoking Status Never   Smokeless Tobacco Never     No family history on file.    Meds/Allergies    No current outpatient medications on file.  No current facility-administered medications for this visit.    Allergies   Allergen Reactions   • Pollen Extract Allergic Rhinitis           Objective    Blood pressure 110/68, pulse 69, height 5' 3\" (1.6 m), weight 62.7 kg (138 lb 4.8 oz), SpO2 98%. Body mass index is 24.5 kg/m².        PHYSICAL EXAM:      General Appearance:   Alert, cooperative, no distress   HEENT:   Normocephalic, atraumatic, anicteric.     Neck:  Supple, symmetrical, trachea midline   Lungs:   Clear to auscultation bilaterally; no rales, rhonchi or wheezing; respirations unlabored    Heart::   Regular rate and rhythm; no murmur, rub, or gallop.   Abdomen:   Soft, non-tender, non-distended; normal bowel sounds; no masses, no organomegaly    Genitalia:   Deferred    Rectal:   Deferred    Extremities:  No cyanosis, clubbing or edema    Pulses:  2+ and symmetric    Skin:  No jaundice, rashes, or lesions    Lymph nodes:  No palpable cervical lymphadenopathy        Lab Results:   No visits with results within 1 Day(s) from this visit.   Latest known visit with results is:   Admission on 05/06/2024, Discharged on 05/06/2024   Component Date Value   • WBC 05/06/2024 " 4.95    • RBC 05/06/2024 4.84    • Hemoglobin 05/06/2024 11.6    • Hematocrit 05/06/2024 37.5    • MCV 05/06/2024 78 (L)    • MCH 05/06/2024 24.0 (L)    • MCHC 05/06/2024 30.9 (L)    • RDW 05/06/2024 19.5 (H)    • MPV 05/06/2024 10.6    • Platelets 05/06/2024 321    • nRBC 05/06/2024 0    • Segmented % 05/06/2024 57    • Immature Grans % 05/06/2024 0    • Lymphocytes % 05/06/2024 33    • Monocytes % 05/06/2024 7    • Eosinophils Relative 05/06/2024 2    • Basophils Relative 05/06/2024 1    • Absolute Neutrophils 05/06/2024 2.83    • Absolute Immature Grans 05/06/2024 0.01    • Absolute Lymphocytes 05/06/2024 1.61    • Absolute Monocytes 05/06/2024 0.35    • Eosinophils Absolute 05/06/2024 0.10    • Basophils Absolute 05/06/2024 0.05    • Sodium 05/06/2024 138    • Potassium 05/06/2024 3.6    • Chloride 05/06/2024 104    • CO2 05/06/2024 26    • ANION GAP 05/06/2024 8    • BUN 05/06/2024 7    • Creatinine 05/06/2024 0.69    • Glucose 05/06/2024 90    • Calcium 05/06/2024 9.4    • AST 05/06/2024 12 (L)    • ALT 05/06/2024 9    • Alkaline Phosphatase 05/06/2024 41    • Total Protein 05/06/2024 7.1    • Albumin 05/06/2024 4.5    • Total Bilirubin 05/06/2024 0.36    • eGFR 05/06/2024 110    • Lipase 05/06/2024 40    • EXT Fecal Occult Blood 05/06/2024 Positive (A)    • Control 05/06/2024 Valid    • Color, UA 05/06/2024 Colorless    • Clarity, UA 05/06/2024 Clear    • Specific Gravity, UA 05/06/2024 1.007    • pH, UA 05/06/2024 5.5    • Leukocytes, UA 05/06/2024 Negative    • Nitrite, UA 05/06/2024 Negative    • Protein, UA 05/06/2024 Negative    • Glucose, UA 05/06/2024 Negative    • Ketones, UA 05/06/2024 40 (2+) (A)    • Urobilinogen, UA 05/06/2024 <2.0    • Bilirubin, UA 05/06/2024 Negative    • Occult Blood, UA 05/06/2024 Moderate (A)    • EXT Preg Test, Ur 05/06/2024 Negative    • Control 05/06/2024 Valid    • TSH 3RD GENERATON 05/06/2024 0.739    • RBC, UA 05/06/2024 1-2    • WBC, UA 05/06/2024 1-2    • Epithelial  Cells 05/06/2024 Occasional    • Bacteria, UA 05/06/2024 Occasional    • MUCUS THREADS 05/06/2024 Occasional (A)          Radiology Results:   No results found.

## 2024-05-07 NOTE — PROGRESS NOTES
[facility] - Outpatient Consultation  Angie Amaya 38 y.o. female MRN: [id number]  Encounter: [id number]          ASSESSMENT AND PLAN:      1. Diarrhea  Change in bowel habits characterized by frequent postprandial diarrhea associated with abdominal pain and gurgling over the last approximately 3 months.  May represent exacerbation of IBS however cannot exclude celiac disease or early inflammatory bowel disease at this time    -Plan for colonoscopy at the same time as EGD    -Procedure was explained in detail to the patient at this time including associated risks and benefits, risks including but not limited to infection, perforation and bleeding    -Prescription and instructions provided for colonoscopy prep    -In the meantime we will check inflammatory markers including C-reactive protein and stool calprotectin    -Will also check stool infectious studies including enteric panel, C. difficile, and ova and parasites    -Will also check celiac disease antibody profile, as well as duodenal biopsies at the time of EGD    -Additionally we will trial Bentyl 10 mg 3 times daily as needed for presumed bowel spasm, I instructed patient in its use and about common side effects      2.  Family history of colon polyps  Patient reports her father had colon polyps    -Will assess for adenomatous polyps at the time of colonoscopy    3.  Rectal bleeding  Patient had endorsed intermittent bright red blood per rectum and mucus per rectum which appears most likely hemorrhoidal but rule out inflammatory bowel disease or malignancy    -Again we will plan for colonoscopy at the same time as EGD    4.  Weight loss, abnormal  Patient reports unintentional weight loss of about 20 pounds over the last 3 months, possibly owing to decreased oral intake in the setting of postprandial GI symptoms described elsewhere in this report, rule out underlying GI malignancy    -Again we will plan for EGD and colonoscopy  concurrently    -Consider CT scan of the abdomen and pelvis if weight loss progresses and endoscopic findings/other workup are abnormal, I encouraged patient to continue to monitor her weight once weekly    5.  Dyspepsia  Postprandial gurgling, generalized abdominal pain, may represent functional dyspepsia/bowel spasm in the setting of IBS but rule out peptic ulcer disease, gastritis    -Plan for EGD at the same time as colonoscopy, will also monitor response to Bentyl as described    -May consider PPI therapy if EGD is revealing for any significant inflammatory pathology    ______________________________________________________________________    HPI: 38-year-old female with history of anxiety who presents for evaluation, she was seen in the emergency room yesterday afternoon with complaint of frequent diarrhea for over a month, she says symptoms generally started in February, as of recently has been having about 6-7 episodes of diarrhea a day with occasional bright red blood per rectum.  She said in the past used to experience episodes like this but generally they would not last longer than about 2 weeks.  In February when her current symptoms started she says she was also experiencing abdominal pain and cramping which resolved after about 2 weeks, however the diarrhea has persisted.  At this point she does still get mild cramping postprandially, followed by feeling of having to have a bowel movement.  She had endorsed stopping taking a medication for her anxiety back in February when she was having the abdominal cramping.    Labs in the emergency room yesterday showed decreased MCV of 78 but normal hemoglobin of 11.6, otherwise CBC, CMP appear unremarkable.  Stool occult blood was apparently checked and found positive.  Orders were placed for stool C. difficile and enteric panel but these tests have not been run yet.    The patient tells me that she had similar pains in April 2023 which lasted for a few weeks  before generally subsiding.  She says she was generally doing relatively well again until February 2024 when her current symptoms began.  She experiences a lot of gurgling sensation after eating, and again postprandial diarrhea.  She believes she is lost about 20 pounds since February.  She does not believe she has ever been trialed on any antispasmodics for IBS, she tells me she was diagnosed with IBS about 15 years ago, having had endoscopic evaluations at the time, she also had EGD and colonoscopy done a few years ago at an outside GI network which were reportedly unremarkable.  She tells me she had a gastric emptying study in the last couple of years that was also negative.  She has not had any abdominal surgeries including cholecystectomy.  She reports her father had colon polyps.  She reports she has been noticing mucus in her stools and also a recent change in her stool odor, fishy in quality.      REVIEW OF SYSTEMS:    CONSTITUTIONAL: Denies any fever, chills, rigors, and weight loss.  HEENT: No earache or tinnitus. Denies hearing loss or visual disturbances.  CARDIOVASCULAR: No chest pain or palpitations.   RESPIRATORY: Denies any cough, hemoptysis, shortness of breath or dyspnea on exertion.  GASTROINTESTINAL: As noted in the History of Present Illness.   GENITOURINARY: No problems with urination. Denies any hematuria or dysuria.  NEUROLOGIC: No dizziness or vertigo, denies headaches.   MUSCULOSKELETAL: Denies any muscle or joint pain.   SKIN: Denies skin rashes or itching.   ENDOCRINE: Denies excessive thirst. Denies intolerance to heat or cold.  PSYCHOSOCIAL: Denies depression or anxiety. Denies any recent memory loss.       Historical Information   Past Medical History:   Diagnosis Date    IBS (irritable bowel syndrome)      Past Surgical History:   Procedure Laterality Date    SHOULDER ARTHROSCOPY       Social History   Social History     Substance and Sexual Activity   Alcohol Use Yes    Comment:  "socially     Social History     Substance and Sexual Activity   Drug Use Never     Social History     Tobacco Use   Smoking Status Never   Smokeless Tobacco Never     No family history on file.    Meds/Allergies     No current outpatient medications on file.  No current facility-administered medications for this visit.    Allergies   Allergen Reactions    Pollen Extract Allergic Rhinitis           Objective     Blood pressure 110/68, pulse 69, height 5' 3\" (1.6 m), weight 62.7 kg (138 lb 4.8 oz), SpO2 98%. Body mass index is 24.5 kg/m².        PHYSICAL EXAM:      General Appearance:   Alert, cooperative, no distress   HEENT:   Normocephalic, atraumatic, anicteric.     Neck:  Supple, symmetrical, trachea midline   Lungs:   Clear to auscultation bilaterally; no rales, rhonchi or wheezing; respirations unlabored    Heart::   Regular rate and rhythm; no murmur, rub, or gallop.   Abdomen:   Soft, non-tender, non-distended; normal bowel sounds; no masses, no organomegaly    Genitalia:   Deferred    Rectal:   Deferred    Extremities:  No cyanosis, clubbing or edema    Pulses:  2+ and symmetric    Skin:  No jaundice, rashes, or lesions    Lymph nodes:  No palpable cervical lymphadenopathy        Lab Results:   No visits with results within 1 Day(s) from this visit.   Latest known visit with results is:   Admission on 05/06/2024, Discharged on 05/06/2024   Component Date Value    WBC 05/06/2024 4.95     RBC 05/06/2024 4.84     Hemoglobin 05/06/2024 11.6     Hematocrit 05/06/2024 37.5     MCV 05/06/2024 78 (L)     MCH 05/06/2024 24.0 (L)     MCHC 05/06/2024 30.9 (L)     RDW 05/06/2024 19.5 (H)     MPV 05/06/2024 10.6     Platelets 05/06/2024 321     nRBC 05/06/2024 0     Segmented % 05/06/2024 57     Immature Grans % 05/06/2024 0     Lymphocytes % 05/06/2024 33     Monocytes % 05/06/2024 7     Eosinophils Relative 05/06/2024 2     Basophils Relative 05/06/2024 1     Absolute Neutrophils 05/06/2024 2.83     Absolute Immature " Grans 05/06/2024 0.01     Absolute Lymphocytes 05/06/2024 1.61     Absolute Monocytes 05/06/2024 0.35     Eosinophils Absolute 05/06/2024 0.10     Basophils Absolute 05/06/2024 0.05     Sodium 05/06/2024 138     Potassium 05/06/2024 3.6     Chloride 05/06/2024 104     CO2 05/06/2024 26     ANION GAP 05/06/2024 8     BUN 05/06/2024 7     Creatinine 05/06/2024 0.69     Glucose 05/06/2024 90     Calcium 05/06/2024 9.4     AST 05/06/2024 12 (L)     ALT 05/06/2024 9     Alkaline Phosphatase 05/06/2024 41     Total Protein 05/06/2024 7.1     Albumin 05/06/2024 4.5     Total Bilirubin 05/06/2024 0.36     eGFR 05/06/2024 110     Lipase 05/06/2024 40     EXT Fecal Occult Blood 05/06/2024 Positive (A)     Control 05/06/2024 Valid     Color, UA 05/06/2024 Colorless     Clarity, UA 05/06/2024 Clear     Specific Gravity, UA 05/06/2024 1.007     pH, UA 05/06/2024 5.5     Leukocytes, UA 05/06/2024 Negative     Nitrite, UA 05/06/2024 Negative     Protein, UA 05/06/2024 Negative     Glucose, UA 05/06/2024 Negative     Ketones, UA 05/06/2024 40 (2+) (A)     Urobilinogen, UA 05/06/2024 <2.0     Bilirubin, UA 05/06/2024 Negative     Occult Blood, UA 05/06/2024 Moderate (A)     EXT Preg Test, Ur 05/06/2024 Negative     Control 05/06/2024 Valid     TSH 3RD GENERATON 05/06/2024 0.739     RBC, UA 05/06/2024 1-2     WBC, UA 05/06/2024 1-2     Epithelial Cells 05/06/2024 Occasional     Bacteria, UA 05/06/2024 Occasional     MUCUS THREADS 05/06/2024 Occasional (A)          Radiology Results:   No results found.

## 2024-05-07 NOTE — ASSESSMENT & PLAN NOTE
Patient reports unintentional weight loss of about 20 pounds over the last 3 months, possibly owing to decreased oral intake in the setting of postprandial GI symptoms described elsewhere in this report, rule out underlying GI malignancy    -Again we will plan for EGD and colonoscopy concurrently    -Consider CT scan of the abdomen and pelvis if weight loss progresses and endoscopic findings/other workup are abnormal, I encouraged patient to continue to monitor her weight once weekly

## 2024-05-07 NOTE — ASSESSMENT & PLAN NOTE
Change in bowel habits characterized by frequent postprandial diarrhea associated with abdominal pain and gurgling over the last approximately 3 months.  May represent exacerbation of IBS however cannot exclude celiac disease or early inflammatory bowel disease at this time    -Plan for colonoscopy at the same time as EGD    -Procedure was explained in detail to the patient at this time including associated risks and benefits, risks including but not limited to infection, perforation and bleeding    -Prescription and instructions provided for colonoscopy prep    -In the meantime we will check inflammatory markers including C-reactive protein and stool calprotectin    -Will also check stool infectious studies including enteric panel, C. difficile, and ova and parasites    -Will also check celiac disease antibody profile, as well as duodenal biopsies at the time of EGD    -Additionally we will trial Bentyl 10 mg 3 times daily as needed for presumed bowel spasm, I instructed patient in its use and about common side effects

## 2024-05-07 NOTE — ASSESSMENT & PLAN NOTE
Postprandial gurgling, generalized abdominal pain, may represent functional dyspepsia/bowel spasm in the setting of IBS but rule out peptic ulcer disease, gastritis    -Plan for EGD at the same time as colonoscopy, will also monitor response to Bentyl as described    -May consider PPI therapy if EGD is revealing for any significant inflammatory pathology

## 2024-05-07 NOTE — PATIENT INSTRUCTIONS
Scheduled date of EGD/colonoscopy (as of today): 5/9/24  Physician performing EGD/colonoscopy: Dr. Spear  Location of EGD/colonoscopy: [facility]  Desired bowel prep reviewed with patient: [email or web address]  Instructions reviewed with patient by: nilo  Clearances:  na

## 2024-05-07 NOTE — ASSESSMENT & PLAN NOTE
Patient had endorsed intermittent bright red blood per rectum and mucus per rectum which appears most likely hemorrhoidal but rule out inflammatory bowel disease or malignancy    -Again we will plan for colonoscopy at the same time as EGD

## 2024-05-08 ENCOUNTER — APPOINTMENT (OUTPATIENT)
Dept: LAB | Facility: AMBULARY SURGERY CENTER | Age: 39
End: 2024-05-08
Payer: MEDICARE

## 2024-05-08 DIAGNOSIS — R10.13 DYSPEPSIA: ICD-10-CM

## 2024-05-08 DIAGNOSIS — R19.7 DIARRHEA: ICD-10-CM

## 2024-05-08 LAB
GLIADIN PEPTIDE IGA SER-ACNC: 11 U/ML
GLIADIN PEPTIDE IGA SER-ACNC: NEGATIVE
GLIADIN PEPTIDE IGG SER-ACNC: 1.6 U/ML
GLIADIN PEPTIDE IGG SER-ACNC: NEGATIVE
TTG IGA SER-ACNC: <0.5 U/ML
TTG IGA SER-ACNC: NEGATIVE
TTG IGG SER-ACNC: <0.8 U/ML
TTG IGG SER-ACNC: NEGATIVE

## 2024-05-08 PROCEDURE — 87493 C DIFF AMPLIFIED PROBE: CPT

## 2024-05-08 PROCEDURE — 87209 SMEAR COMPLEX STAIN: CPT

## 2024-05-08 PROCEDURE — 83993 ASSAY FOR CALPROTECTIN FECAL: CPT

## 2024-05-08 PROCEDURE — 87505 NFCT AGENT DETECTION GI: CPT

## 2024-05-08 PROCEDURE — 87177 OVA AND PARASITES SMEARS: CPT

## 2024-05-09 ENCOUNTER — ANESTHESIA (OUTPATIENT)
Dept: GASTROENTEROLOGY | Facility: HOSPITAL | Age: 39
End: 2024-05-09

## 2024-05-09 ENCOUNTER — ANESTHESIA EVENT (OUTPATIENT)
Dept: GASTROENTEROLOGY | Facility: HOSPITAL | Age: 39
End: 2024-05-09

## 2024-05-09 ENCOUNTER — HOSPITAL ENCOUNTER (OUTPATIENT)
Dept: GASTROENTEROLOGY | Facility: HOSPITAL | Age: 39
Setting detail: OUTPATIENT SURGERY
End: 2024-05-09
Payer: MEDICARE

## 2024-05-09 VITALS
BODY MASS INDEX: 24.1 KG/M2 | HEIGHT: 63 IN | HEART RATE: 70 BPM | SYSTOLIC BLOOD PRESSURE: 114 MMHG | OXYGEN SATURATION: 100 % | TEMPERATURE: 97.2 F | RESPIRATION RATE: 16 BRPM | DIASTOLIC BLOOD PRESSURE: 70 MMHG | WEIGHT: 136 LBS

## 2024-05-09 DIAGNOSIS — R10.13 DYSPEPSIA: ICD-10-CM

## 2024-05-09 DIAGNOSIS — R19.7 DIARRHEA: ICD-10-CM

## 2024-05-09 LAB
C COLI+JEJUNI TUF STL QL NAA+PROBE: NEGATIVE
C DIFF TOX GENS STL QL NAA+PROBE: NEGATIVE
EC STX1+STX2 GENES STL QL NAA+PROBE: NEGATIVE
SALMONELLA SP SPAO STL QL NAA+PROBE: NEGATIVE
SHIGELLA SP+EIEC IPAH STL QL NAA+PROBE: NEGATIVE

## 2024-05-09 PROCEDURE — 88305 TISSUE EXAM BY PATHOLOGIST: CPT | Performed by: PATHOLOGY

## 2024-05-09 PROCEDURE — 43239 EGD BIOPSY SINGLE/MULTIPLE: CPT | Performed by: INTERNAL MEDICINE

## 2024-05-09 PROCEDURE — 45380 COLONOSCOPY AND BIOPSY: CPT | Performed by: INTERNAL MEDICINE

## 2024-05-09 RX ORDER — PROPOFOL 10 MG/ML
INJECTION, EMULSION INTRAVENOUS AS NEEDED
Status: DISCONTINUED | OUTPATIENT
Start: 2024-05-09 | End: 2024-05-09

## 2024-05-09 RX ORDER — LIDOCAINE HYDROCHLORIDE 20 MG/ML
INJECTION, SOLUTION EPIDURAL; INFILTRATION; INTRACAUDAL; PERINEURAL AS NEEDED
Status: DISCONTINUED | OUTPATIENT
Start: 2024-05-09 | End: 2024-05-09

## 2024-05-09 RX ORDER — SODIUM CHLORIDE 9 MG/ML
INJECTION, SOLUTION INTRAVENOUS CONTINUOUS PRN
Status: DISCONTINUED | OUTPATIENT
Start: 2024-05-09 | End: 2024-05-09

## 2024-05-09 RX ADMIN — PROPOFOL 50 MG: 10 INJECTION, EMULSION INTRAVENOUS at 12:54

## 2024-05-09 RX ADMIN — PROPOFOL 30 MG: 10 INJECTION, EMULSION INTRAVENOUS at 12:44

## 2024-05-09 RX ADMIN — PROPOFOL 50 MG: 10 INJECTION, EMULSION INTRAVENOUS at 12:37

## 2024-05-09 RX ADMIN — PROPOFOL 50 MG: 10 INJECTION, EMULSION INTRAVENOUS at 12:49

## 2024-05-09 RX ADMIN — PROPOFOL 150 MG: 10 INJECTION, EMULSION INTRAVENOUS at 12:34

## 2024-05-09 RX ADMIN — LIDOCAINE HYDROCHLORIDE 100 MG: 20 INJECTION, SOLUTION EPIDURAL; INFILTRATION; INTRACAUDAL; PERINEURAL at 12:34

## 2024-05-09 RX ADMIN — PROPOFOL 50 MG: 10 INJECTION, EMULSION INTRAVENOUS at 12:40

## 2024-05-09 RX ADMIN — SODIUM CHLORIDE: 0.9 INJECTION, SOLUTION INTRAVENOUS at 12:34

## 2024-05-09 NOTE — ANESTHESIA PREPROCEDURE EVALUATION
Procedure:  COLONOSCOPY  EGD    Relevant Problems   GI/HEPATIC   (+) Rectal bleeding        Physical Exam    Airway    Mallampati score: II  TM Distance: >3 FB  Neck ROM: full     Dental   No notable dental hx     Cardiovascular      Pulmonary      Other Findings  post-pubertal.      Anesthesia Plan  ASA Score- 2     Anesthesia Type- IV sedation with anesthesia with ASA Monitors.         Additional Monitors:     Airway Plan:     Comment: I have seen the patient and reviewed the history.  Patient to receive IV sedation with full ASA monitors.  Risks discussed with the patient, consent signed.  .       Plan Factors-Exercise tolerance (METS): >4 METS.    Chart reviewed.    Patient summary reviewed.                  Induction- intravenous.    Postoperative Plan-     Informed Consent- Anesthetic plan and risks discussed with patient.  I personally reviewed this patient with the CRNA. Discussed and agreed on the Anesthesia Plan with the CRNA..

## 2024-05-09 NOTE — H&P
"History and Physical -  [facility]  Angie Amaya 38 y.o. female MRN: [id number]    HPI: Angie Amaya is a 38 y.o. year old female who presents for evaluation of dyspepsia symptoms, rectal bleeding and diarrhea.      Review of Systems    Historical Information   Past Medical History:   Diagnosis Date    IBS (irritable bowel syndrome)      Past Surgical History:   Procedure Laterality Date    SHOULDER ARTHROSCOPY       Social History   Social History     Substance and Sexual Activity   Alcohol Use Yes    Comment: socially     Social History     Substance and Sexual Activity   Drug Use Never     Social History     Tobacco Use   Smoking Status Never   Smokeless Tobacco Never     History reviewed. No pertinent family history.    Meds/Allergies     (Not in a hospital admission)      Allergies   Allergen Reactions    Pollen Extract Allergic Rhinitis       Objective     /61   Pulse 70   Temp (!) 97 °F (36.1 °C) (Temporal)   Resp 18   Ht 5' 3\" (1.6 m)   Wt 61.7 kg (136 lb)   LMP 05/08/2024 Comment: Tubal Ligation  SpO2 100%   BMI 24.09 kg/m²       PHYSICAL EXAM    Gen: NAD  CV: RRR  CHEST: Clear  ABD: soft, NT/ND  EXT: no edema  Neuro: AAO      ASSESSMENT/PLAN:  This is a 38 y.o. year old female here for evaluation of bleeding, diarrhea and dyspepsia symptoms.    PLAN:   Procedure: EGD and colonoscopy.      "

## 2024-05-14 PROCEDURE — 88305 TISSUE EXAM BY PATHOLOGIST: CPT | Performed by: PATHOLOGY

## 2024-05-16 LAB — CALPROTECTIN STL-MCNT: <5 UG/G (ref 0–120)

## 2024-05-21 ENCOUNTER — TELEPHONE (OUTPATIENT)
Dept: GASTROENTEROLOGY | Facility: AMBULARY SURGERY CENTER | Age: 39
End: 2024-05-21

## 2024-05-21 DIAGNOSIS — R19.7 DIARRHEA, UNSPECIFIED TYPE: Primary | ICD-10-CM

## 2024-05-21 NOTE — TELEPHONE ENCOUNTER
Pt transferred to myself by Latrell.    Results of stool studies reviewed with pt. Pt requests pathology results of tissue biopsies 05/09.

## 2024-05-21 NOTE — TELEPHONE ENCOUNTER
Carmencita Gonzalez PA-C  5/17/2024  5:00 PM EDT       Please advise patient as she does not appear to be on MyChart, her stool test showed no obvious evidence of infection or inflammation within the colon.  Will follow-up on her upcoming endoscopic evaluations for more information.

## 2024-05-22 ENCOUNTER — TELEPHONE (OUTPATIENT)
Dept: GASTROENTEROLOGY | Facility: AMBULARY SURGERY CENTER | Age: 39
End: 2024-05-22

## 2024-05-22 NOTE — TELEPHONE ENCOUNTER
Called and spoke to the patient regarding her colonoscopy results and recommendations from the provider. Informed her that SIBO test could be picked up here at the office and returned when completed. Patient stated she would be in possibly today to pick it up, but certainly by tomorrow. I left the SIBO test and instructions for her at the . Also, informed patient of celiac lab work to be completed. Informed patient that Dr. Spear would like a follow-up visit scheduled after completion of the SIBO test and EGD pending results of lab work and SIBO test. Patient verbalized understanding.

## 2024-05-23 ENCOUNTER — APPOINTMENT (OUTPATIENT)
Dept: LAB | Facility: AMBULARY SURGERY CENTER | Age: 39
End: 2024-05-23
Payer: MEDICARE

## 2024-05-23 DIAGNOSIS — R19.7 DIARRHEA, UNSPECIFIED TYPE: ICD-10-CM

## 2024-05-23 PROCEDURE — 36415 COLL VENOUS BLD VENIPUNCTURE: CPT

## 2024-05-28 ENCOUNTER — TELEPHONE (OUTPATIENT)
Age: 39
End: 2024-05-28

## 2024-05-28 NOTE — TELEPHONE ENCOUNTER
Patients GI provider:  Dr. Spear    Number to return call: [phone]    Reason for call: Pt calling for lab results. Pt states you may leave a message on her.    Scheduled procedure/appointment date if applicable: N/A

## 2024-05-28 NOTE — TELEPHONE ENCOUNTER
Called and spoke with the patient. Informed her that as soon as Dr. Spear reviews the lab results and sends results, I will call her with them. Patient verbalized understanding.

## 2024-05-31 LAB — HLA-DQ2 QL: NORMAL

## 2024-06-03 ENCOUNTER — OFFICE VISIT (OUTPATIENT)
Dept: GASTROENTEROLOGY | Facility: CLINIC | Age: 39
End: 2024-06-03
Payer: MEDICARE

## 2024-06-03 DIAGNOSIS — R19.7 DIARRHEA, UNSPECIFIED TYPE: Primary | ICD-10-CM

## 2024-06-03 PROCEDURE — 91065 BREATH HYDROGEN/METHANE TEST: CPT | Performed by: INTERNAL MEDICINE

## 2024-06-03 NOTE — Clinical Note
Positive breath test results for bacterial overgrowth.   Recommend treatment Rifaximin 550mg PO TID x 10 days followed by 6 weeks of probiotics.   Valeria Mcgowan MD

## 2024-06-25 ENCOUNTER — TELEPHONE (OUTPATIENT)
Age: 39
End: 2024-06-25

## 2024-06-25 DIAGNOSIS — K63.8219 SMALL INTESTINAL BACTERIAL OVERGROWTH (SIBO): ICD-10-CM

## 2024-06-25 DIAGNOSIS — K58.0 IRRITABLE BOWEL SYNDROME WITH DIARRHEA: Primary | ICD-10-CM

## 2024-06-25 NOTE — TELEPHONE ENCOUNTER
PA for xifaxan    Submitted via    [x]CMM-KEY [id number]   []Surescripts-Case ID #   []Faxed to plan   []Other website   []Phone call Case ID #     Office notes sent, clinical questions answered. Awaiting determination    Turnaround time for your insurance to make a decision on your Prior Authorization can take 7-21 business days.

## 2024-06-25 NOTE — TELEPHONE ENCOUNTER
PA for Xifaxan    Submitted via    [x]CMM-KEY [id number]  Waiting for next steps/questions to complete pa

## 2024-06-27 NOTE — TELEPHONE ENCOUNTER
PA for Xifaxan Approved     Date(s) approved until 7/10/2024  Case #    Patient advised by          [] [employer]hart Message  [] Phone call   [x]LMOM  []L/M to call office as no active Communication consent on file  []Unable to leave detailed message as VM not approved on Communication consent       Pharmacy advised by    [x]Fax  []Phone call    Approval letter scanned into Media Yes

## 2024-07-03 ENCOUNTER — TELEPHONE (OUTPATIENT)
Dept: GASTROENTEROLOGY | Facility: CLINIC | Age: 39
End: 2024-07-03

## 2024-07-03 NOTE — TELEPHONE ENCOUNTER
Advised all recommendations/results.  Pt advised to call with any questions/concerns, continuation of symptoms.

## 2024-07-08 NOTE — TELEPHONE ENCOUNTER
Called and spoke to patient to confirm that her medication for Xifaxan is at her [facility]. Patient was confused and stated she received a phone call on 7/3 and no medication was sent. Assured patient that she has the correct medication that she picked up from the pharmacy. Advised to return call with any questions/concerns.

## 2024-07-08 NOTE — TELEPHONE ENCOUNTER
Patient called in. Patient spoke with someone last week. Patient states that they were going to call a new medication to the pharmacy for her. Patient states that the pharmacy did not receive a new script for her.   Patient is requesting a call back and the script to be sent to the pharmacy [facility] [address]

## 2024-07-22 ENCOUNTER — TELEPHONE (OUTPATIENT)
Age: 39
End: 2024-07-22

## 2024-07-22 ENCOUNTER — NURSE TRIAGE (OUTPATIENT)
Age: 39
End: 2024-07-22

## 2024-07-22 DIAGNOSIS — K63.8219 SMALL INTESTINAL BACTERIAL OVERGROWTH (SIBO): Primary | ICD-10-CM

## 2024-07-22 NOTE — TELEPHONE ENCOUNTER
Can try repeating course of xifaxan 500 mg TID for 14 days, sent to pharmacy. Also should have f/u scheduled as well.

## 2024-07-22 NOTE — TELEPHONE ENCOUNTER
"SPOKE WITH PT, RECENTLY TREATED FOR SIBO WITH 2 WEEK COURSE OF XIFAXAN, WITH NO CHANGE IN SYMPTOMS. PT WITH CONTINUED CONSTANT NAUSEA, INTERMITTENT ABDOMINAL PAIN RANGING FROM 3-7/10, VARIED BM'S MUSHY VS FORMED, POOR PO INTAKE DUE TO NAUSEA. PT REQUESTING ANOTHER COURSE O XIFAXAN OR ALTERNATE RECOMMENDATIONS. PT IS WILLING TO PAY OUT OF POCKET IF INSURANCE IS AN ISSUE. PLEASE ADVISE.       Reason for Disposition   Information only question and nurse able to answer    Answer Assessment - Initial Assessment Questions  1. REASON FOR CALL or QUESTION: \"What is your reason for calling today?\" or \"How can I best help you?\" or \"What question do you have that I can help answer?\"      SPOKE WITH PT, RECENTLY TREATED FOR SIBO WITH 2 WEEK COURSE OF XIFAXAN, WITH NO CHANGE IN SYMPTOMS. PT WITH CONTINUED CONSTANT NAUSEA, INTERMITTENT ABDOMINAL PAIN RANGING FROM 3-7/10, VARIED BM'S MUSHY VS FORMED, POOR PO INTAKE DUE TO NAUSEA. PT REQUESTING ANOTHER COURSE O XIFAXAN OR ALTERNATE RECOMMENDATIONS. PT IS WILLING TO PAY OUT OF POCKET IF INSURANCE IS AN ISSUE. PLEASE ADVISE.    Protocols used: Information Only Call - No Triage-ADULT-[place]    "

## 2024-07-22 NOTE — TELEPHONE ENCOUNTER
PA for Xifaxan (another course for SIBO- symptoms persist)    Submitted via    [x]CMM-KEY [id number]   Waiting for next steps/questions to complete pa

## 2024-07-22 NOTE — TELEPHONE ENCOUNTER
I sent the xifaxan but she will need follow up appt for any further treatments or recommendations moving forward so would advise she schedule an appt since we are scheduling out for appts.

## 2024-07-23 NOTE — TELEPHONE ENCOUNTER
PA for Xifaxan    Submitted via    [x]CMM-KEY [id number]   []Surescripts-Case ID #   []Faxed to plan   []Other website   []Phone call Case ID #     Office notes sent, clinical questions answered. Awaiting determination    Turnaround time for your insurance to make a decision on your Prior Authorization can take 7-21 business days.

## 2024-07-23 NOTE — TELEPHONE ENCOUNTER
PA for Xifaxan Approved     Date(s) approved [employer]catalino 8/6/2024    Case #    Patient advised by          [] [employer] Message  [] Phone call   [x]LMOM  []L/M to call office as no active Communication consent on file  []Unable to leave detailed message as VM not approved on Communication consent       Pharmacy advised by    [x]Fax  []Phone call    Approval letter scanned into Media Yes

## 2024-08-12 ENCOUNTER — NURSE TRIAGE (OUTPATIENT)
Age: 39
End: 2024-08-12

## 2024-08-12 ENCOUNTER — TELEPHONE (OUTPATIENT)
Age: 39
End: 2024-08-12

## 2024-08-12 NOTE — TELEPHONE ENCOUNTER
Patients GI provider:  Dr. Spear    Number to return call: [phone]    Reason for call: Pt calling to ask for an appt because she is still having all her same symptoms (abdominal pain,Diarrhea,Dyspepsia ect), despite having been through antibiotics to treat. She says the only change is that she will occasionally have an normal BM but only sometimes and everything else is still and issue. I offered her my next available with is [phone] but Pt declined, she says she can't wait that long and asked that I reach out to her Dr to explain as this in an ongoing issue for her. She would like to know if the Dr/PA could see her any sooner. I have added her to the [facility] as well     Scheduled procedure/appointment date if applicable: NA

## 2024-08-12 NOTE — TELEPHONE ENCOUNTER
"Last ov 5/7/24: Diarrhea, Nausea  Colon/EGD 5/9/24, +SIBO treated with two rounds Xifaxan finished last dose last Tuesday. Celiac Blood work was negative.     Pt c/o abdominal pain, formed stools that end with diarrhea 3-6 daily, nausea.  Is having daily bm's, feels like not completely evacuating. Symptoms remain unchanged from last ov.      Reason for Disposition   Unexplained nausea    Additional Information   Nausea    Answer Assessment - Initial Assessment Questions  1. NAUSEA SEVERITY: \"How bad is the nausea?\" (e.g., mild, moderate, severe; dehydration, weight loss)    Symptoms remain unchanged since last ov    Protocols used: GI Multiple Symptoms - Guideline Selection-ADULT-AH, Nausea-ADULT-AH    "

## 2024-08-14 NOTE — TELEPHONE ENCOUNTER
Spoke with pt gave provider recommendation as when to report to ER for eval. Urgent slot used per provider 8/26

## 2024-08-26 ENCOUNTER — OFFICE VISIT (OUTPATIENT)
Dept: GASTROENTEROLOGY | Facility: AMBULARY SURGERY CENTER | Age: 39
End: 2024-08-26
Payer: MEDICARE

## 2024-08-26 VITALS
SYSTOLIC BLOOD PRESSURE: 118 MMHG | BODY MASS INDEX: 23.18 KG/M2 | DIASTOLIC BLOOD PRESSURE: 78 MMHG | HEIGHT: 63 IN | OXYGEN SATURATION: 98 % | WEIGHT: 130.8 LBS | HEART RATE: 94 BPM

## 2024-08-26 DIAGNOSIS — R10.813 RIGHT LOWER QUADRANT ABDOMINAL TENDERNESS WITHOUT REBOUND TENDERNESS: ICD-10-CM

## 2024-08-26 DIAGNOSIS — K63.8219 SMALL INTESTINAL BACTERIAL OVERGROWTH (SIBO): Primary | ICD-10-CM

## 2024-08-26 DIAGNOSIS — R63.4 UNINTENTIONAL WEIGHT LOSS: ICD-10-CM

## 2024-08-26 PROCEDURE — 99214 OFFICE O/P EST MOD 30 MIN: CPT | Performed by: PHYSICIAN ASSISTANT

## 2024-08-26 RX ORDER — METRONIDAZOLE 250 MG/1
250 TABLET ORAL EVERY 8 HOURS SCHEDULED
Qty: 30 TABLET | Refills: 0 | Status: SHIPPED | OUTPATIENT
Start: 2024-08-26 | End: 2024-09-05

## 2024-08-26 NOTE — ASSESSMENT & PLAN NOTE
Intermittent generalized abdominal pain, some right lower quadrant tenderness elicited on exam as well, and reporting ongoing unintentional weight loss, down about 8 pounds now since last office visit, with reported food aversion secondary to GI upset.  Has had somewhat extensive GI workup thus far, and did not respond to Xifaxan for SIBO after positive test.  Differential diagnosis includes SIBO refractory to Xifaxan, mixed pattern IBS with bowel spasm, rule out less likely mid-enteral Crohn's or intra-abdominal/intrapelvic malignancy    -Can consider alternate antibiotic course for SIBO, will discuss with colleagues    -Given patient's irregular stool habits and consistency, and complains of gas pains, I did suggest switching to soluble fiber such as Benefiber, Citrucel, etc., for Metamucil Gummies    -Can also trial different probiotic, also suggested OTC IBgard    -Advised patient that she can use dicyclomine as needed for abdominal cramping and diarrhea but would avoid this during times of constipation, she has active prescription for this    -Suggested patient keep a food diary, write down last 2 or 3 meals when she experiences flareups of symptoms, and crosscheck with FODMAP diet for which she was provided information at this time    -Advised patient at length about the results of her workup thus far, no evidence of celiac disease given the entire workup thus far    - Given ongoing weight loss and RLQ tenderness on exam, reasonable to check CT scan abdomen/pelvis with contrast    -Follow up in a few months

## 2024-08-26 NOTE — PROGRESS NOTES
Follow-up Note -  [facility]  Angie Jose Luis 1985 38 y.o. adult         ASSESSMENT & PLAN:    Unintentional weight loss  See RLQ tenderness    Right lower quadrant abdominal tenderness without rebound tenderness  Intermittent generalized abdominal pain, some right lower quadrant tenderness elicited on exam as well, and reporting ongoing unintentional weight loss, down about 8 pounds now since last office visit, with reported food aversion secondary to GI upset.  Has had somewhat extensive GI workup thus far, and did not respond to Xifaxan for SIBO after positive test.  Differential diagnosis includes SIBO refractory to Xifaxan, mixed pattern IBS with bowel spasm, rule out less likely mid-enteral Crohn's or intra-abdominal/intrapelvic malignancy    -Can consider alternate antibiotic course for SIBO, will discuss with colleagues    -Given patient's irregular stool habits and consistency, and complains of gas pains, I did suggest switching to soluble fiber such as Benefiber, Citrucel, etc., for Metamucil Gummies    -Can also trial different probiotic, also suggested OTC IBgard    -Advised patient that she can use dicyclomine as needed for abdominal cramping and diarrhea but would avoid this during times of constipation, she has active prescription for this    -Suggested patient keep a food diary, write down last 2 or 3 meals when she experiences flareups of symptoms, and crosscheck with FODMAP diet for which she was provided information at this time    -Advised patient at length about the results of her workup thus far, no evidence of celiac disease given the entire workup thus far    - Given ongoing weight loss and RLQ tenderness on exam, reasonable to check CT scan abdomen/pelvis with contrast    -Follow up in a few months    Addendum - discussed with Dr. Nava, will try to reach out to patient about SIBO antibiotic alternatives to rifaximin, can trial metronidazole 250 mg tid for 7-10  "days, will send Rx to patient's pharmacy.      Reason: Follow-up; abdominal pain, irregular bowel habits, unintentional weight loss    HPI: 38-year-old female who presents for follow-up, I had last seen her for evaluation in May 2024 for symptoms of diarrhea, she had reported long history of bowel issues, diagnosed with IBS by outside GI around 2007, we performed EGD and colonoscopy showing no significant gross abnormalities, 1 polyp was removed from the rectum, TI appeared normal, question for increased intraepithelial lymphocytes on duodenal biopsies but patient reports that this time she was taking NSAIDs regularly at that point, and currently.  She also had negative DQ 2/8 allele testing and negative celiac disease profile.    At this time patient recently completed 2 courses of Xifaxan for SIBO, after a positive breath test.  She says this did not really affect her GI symptoms.  She is wondering if other treatment for SIBO may be indicated.  She says she is avoiding eating out of fear of causing stomach problems, \"cannot leave the house\".  Appears to have lost 8 pounds since last office visit with us in May.  She says her stool consistency and frequency actually varies however, she does sometimes have formed bowel movements but oftentimes these are small and With sensation of incomplete evacuation.  Denies rectal bleeding or melena.  She has a frequent pressure-like sensation of her entire abdomen which is frequent, tends to be worse with bending forward, she says for the last 5 days has felt like she has been having gas pains as well, but simethicone has not been helpful.  She says she is not currently taking dicyclomine.  She says she takes a probiotic and has been on the same 1 for a while now.  She says she started Metamucil Gummies recently.  She does express some frustration at inconclusivity of her workup thus far, \"there is something wrong with me\".    REVIEW OF SYSTEMS:      CONSTITUTIONAL: Denies any " fever, chills, or rigors. Good appetite, and no recent weight loss.  HEENT: No earache or tinnitus. Denies hearing loss or visual disturbances.  CARDIOVASCULAR: No chest pain or palpitations.   RESPIRATORY: Denies any cough, hemoptysis, shortness of breath or dyspnea on exertion.  GASTROINTESTINAL: As noted in the History of Present Illness.   GENITOURINARY: No problems with urination. Denies any hematuria or dysuria.  NEUROLOGIC: No dizziness or vertigo, denies headaches.   MUSCULOSKELETAL: Denies any muscle or joint pain.   SKIN: Denies skin rashes or itching.   ENDOCRINE: Denies excessive thirst. Denies intolerance to heat or cold.  PSYCHOSOCIAL: Denies depression or anxiety. Denies any recent memory loss.     Past Medical History:   Diagnosis Date    IBS (irritable bowel syndrome)       Past Surgical History:   Procedure Laterality Date    SHOULDER ARTHROSCOPY       Social History     Socioeconomic History    Marital status: /Civil Union     Spouse name: Not on file    Number of children: Not on file    Years of education: Not on file    Highest education level: Not on file   Occupational History    Not on file   Tobacco Use    Smoking status: Never    Smokeless tobacco: Never   Vaping Use    Vaping status: Some Days   Substance and Sexual Activity    Alcohol use: Yes     Comment: socially    Drug use: Never    Sexual activity: Not on file   Other Topics Concern    Not on file   Social History Narrative    Not on file     Social Determinants of Health     Financial Resource Strain: Not on file   Food Insecurity: Not on file   Transportation Needs: Not on file   Physical Activity: Not on file   Stress: Not on file   Social Connections: Not on file   Intimate Partner Violence: Not on file   Housing Stability: Not on file     History reviewed. No pertinent family history.  Pollen extract  Current Outpatient Medications   Medication Sig Dispense Refill    Calcium Carbonate Antacid (TUMS PO) Take by mouth       "Cholecalciferol 25 MCG (1000 UT) CHEW Chew 5,000 Units      dicyclomine (BENTYL) 10 mg capsule Take 1 capsule (10 mg total) by mouth 3 (three) times a day as needed (bowel spasm, abdominal cramping, diarrhea, stool urgency) 90 capsule 0    gabapentin (NEURONTIN) 800 mg tablet Take 800 mg by mouth 3 (three) times a day      Lactobacillus (PROBIOTIC ACIDOPHILUS PO) Take by mouth daily       No current facility-administered medications for this visit.       Blood pressure 118/78, pulse 94, height 5' 3\" (1.6 m), weight 59.3 kg (130 lb 12.8 oz), SpO2 98%.    PHYSICAL EXAM:      General Appearance:   Alert, cooperative, no distress, appears stated age    HEENT:   Normocephalic, atraumatic, anicteric.     Neck:  Supple, symmetrical, trachea midline, no adenopathy;    thyroid: no enlargement/tenderness/nodules; no carotid  bruit or JVD    Lungs:   Clear to auscultation bilaterally; no rales, rhonchi or wheezing; respirations unlabored    Heart::   S1 and S2 normal; regular rate and rhythm; no murmur, rub, or gallop.   Abdomen:   Soft, RLQ tenderness noted without rebound, non-distended; normal bowel sounds; no masses, no organomegaly    Extremities: No edema, erythema, wounds, rashes   Rectal:   Deferred                      Lab Results   Component Value Date    WBC 4.95 05/06/2024    HGB 11.6 05/06/2024    HCT 37.5 05/06/2024    MCV 78 (L) 05/06/2024     05/06/2024     Lab Results   Component Value Date    CALCIUM 9.4 05/06/2024    K 3.6 05/06/2024    CO2 26 05/06/2024     05/06/2024    BUN 7 05/06/2024    CREATININE 0.69 05/06/2024     Lab Results   Component Value Date    ALT 9 05/06/2024    AST 12 (L) 05/06/2024    ALKPHOS 41 05/06/2024     No results found for: \"INR\", \"PROTIME\"    Colonoscopy    Result Date: 5/9/2024  Impression: Subcentimeter polyp in the rectum was removed with cold forceps biopsy Performed forceps biopsies in the ascending colon and descending colon Small hemorrhoids Diverticulosis of " mild severity in the sigmoid colon RECOMMENDATION:  Repeat colonoscopy in 7 years  Personal history of colon polyps  Follow biopsy results in 2 to 3 weeks.  Radhika Spear MD     EGD    Result Date: 5/9/2024  Impression: The body of the stomach and antrum appeared normal. Performed random biopsy to rule out H. pylori. Small type I hiatal hernia The duodenal bulb, 1st part of the duodenum and 2nd part of the duodenum appeared normal. Performed random biopsy to rule out celiac disease. RECOMMENDATION:  Await pathology results Follow biopsy results in 2 weeks. Will proceed with colonoscopy. Avoid NSAIDs.   Radhika Spear MD

## 2024-09-05 ENCOUNTER — TELEPHONE (OUTPATIENT)
Dept: GASTROENTEROLOGY | Facility: CLINIC | Age: 39
End: 2024-09-05

## 2024-09-11 ENCOUNTER — HOSPITAL ENCOUNTER (OUTPATIENT)
Dept: CT IMAGING | Facility: HOSPITAL | Age: 39
Discharge: HOME | End: 2024-09-11
Payer: MEDICARE

## 2024-09-11 ENCOUNTER — NURSE TRIAGE (OUTPATIENT)
Age: 39
End: 2024-09-11

## 2024-09-11 DIAGNOSIS — R63.4 UNINTENTIONAL WEIGHT LOSS: ICD-10-CM

## 2024-09-11 DIAGNOSIS — R10.813 RIGHT LOWER QUADRANT ABDOMINAL TENDERNESS WITHOUT REBOUND TENDERNESS: ICD-10-CM

## 2024-09-11 PROCEDURE — 74177 CT ABD & PELVIS W/CONTRAST: CPT

## 2024-09-11 RX ADMIN — IOHEXOL 100 ML: 350 INJECTION, SOLUTION INTRAVENOUS at 16:50

## 2024-09-11 NOTE — TELEPHONE ENCOUNTER
Patient states that she wanted to make sure that the shoot her ct scan from every direction because the xray almost missed her moms mass.  Patient aware that a CT scan is different from an Xray.   Explained that the CT scan will look from directions.     Patient states that she feels on the right side there is a lump between her hip and belly button.   She having a ct scan done today at 5.     Reason for Disposition   Information only question and nurse able to answer    Answer Assessment - Initial Assessment Questions  1. REASON FOR CALL or QUESTION: Patient states that she wanted to make sure that the shoot her ct scan from every direction because the xray almost missed her moms mass.  Patient aware that a CT scan is different from an Xray.   Explained that the CT scan will look from directions.     Patient states that she feels on the right side there is a lump between her hip and belly button.   She having a ct scan done today at 5.    Protocols used: Information Only Call - No Triage-ADULT-[place]

## 2024-09-16 ENCOUNTER — TELEPHONE (OUTPATIENT)
Age: 39
End: 2024-09-16

## 2024-09-16 DIAGNOSIS — R63.4 UNINTENTIONAL WEIGHT LOSS: ICD-10-CM

## 2024-09-16 DIAGNOSIS — R10.84 GENERALIZED ABDOMINAL PAIN: Primary | ICD-10-CM

## 2024-09-16 DIAGNOSIS — R19.7 DIARRHEA, UNSPECIFIED TYPE: ICD-10-CM

## 2024-09-16 NOTE — TELEPHONE ENCOUNTER
MASOUD-Spoke to patient on phone and reviewed results of CT scan.  Patient had an EGD and colonoscopy in May 2024 which was unrevealing of cause of symptoms.  Patient also had previous stool studies which were negative fecal calprotectin and CRP which were within normal limits.  Patient has been treated multiple times for SIBO but feels like she always gets reoccurrence of symptoms when she stops the medication.  Patient continues to have abdominal pain, nausea, and diarrhea.  I ordered pancreatic elastase for further evaluation and MRI of abdomen.  I am forwarding message to Carmencita who follows with patient. Thank you

## 2024-09-16 NOTE — TELEPHONE ENCOUNTER
Please call patient and inform her that CT of abdomen showed nonspecific jejunum wall thickening. Wall thickening may be  from enteritis. Thank you

## 2024-09-18 ENCOUNTER — APPOINTMENT (OUTPATIENT)
Dept: LAB | Facility: AMBULARY SURGERY CENTER | Age: 39
End: 2024-09-18

## 2024-09-19 ENCOUNTER — APPOINTMENT (OUTPATIENT)
Dept: LAB | Facility: AMBULARY SURGERY CENTER | Age: 39
End: 2024-09-19
Payer: MEDICARE

## 2024-09-19 DIAGNOSIS — R19.7 DIARRHEA, UNSPECIFIED TYPE: ICD-10-CM

## 2024-09-19 PROCEDURE — 82653 EL-1 FECAL QUANTITATIVE: CPT

## 2024-09-19 NOTE — TELEPHONE ENCOUNTER
No I order MRI abdomen and pelvis. Patient follows with you had recent EGD and colonoscopy unrevealing of cause of symptoms. It is up to you if you want a MR enterography. Thank you

## 2024-09-20 ENCOUNTER — TELEPHONE (OUTPATIENT)
Dept: GASTROENTEROLOGY | Facility: CLINIC | Age: 39
End: 2024-09-20

## 2024-09-20 DIAGNOSIS — R63.4 UNINTENTIONAL WEIGHT LOSS: ICD-10-CM

## 2024-09-20 DIAGNOSIS — R10.31 RLQ ABDOMINAL PAIN: ICD-10-CM

## 2024-09-20 DIAGNOSIS — K52.9 ENTERITIS: Primary | ICD-10-CM

## 2024-09-20 LAB — ELASTASE PANC STL-MCNT: >800 UG/G

## 2024-09-25 ENCOUNTER — TELEPHONE (OUTPATIENT)
Age: 39
End: 2024-09-25

## 2024-09-25 NOTE — TELEPHONE ENCOUNTER
Patient called to obtain all her records to be send to another GI doctor, I transferred to medical records to further assist thank you

## 2024-10-07 ENCOUNTER — TELEPHONE (OUTPATIENT)
Age: 39
End: 2024-10-07

## 2024-10-07 NOTE — TELEPHONE ENCOUNTER
Patient called questioning where she could go for barium for upcoming MRI / Instructed patient to go to the hospital Radiology Dept and they will give it to her there.

## 2024-10-10 ENCOUNTER — HOSPITAL ENCOUNTER (OUTPATIENT)
Dept: MRI IMAGING | Facility: HOSPITAL | Age: 39
End: 2024-10-10
Payer: MEDICARE

## 2024-10-10 DIAGNOSIS — R10.31 RLQ ABDOMINAL PAIN: ICD-10-CM

## 2024-10-10 DIAGNOSIS — K52.9 ENTERITIS: ICD-10-CM

## 2024-10-10 DIAGNOSIS — R63.4 UNINTENTIONAL WEIGHT LOSS: ICD-10-CM

## 2024-10-10 PROCEDURE — A9585 GADOBUTROL INJECTION: HCPCS | Performed by: PHYSICIAN ASSISTANT

## 2024-10-10 PROCEDURE — 72197 MRI PELVIS W/O & W/DYE: CPT

## 2024-10-10 PROCEDURE — 74183 MRI ABD W/O CNTR FLWD CNTR: CPT

## 2024-10-10 RX ORDER — GADOBUTROL 604.72 MG/ML
5 INJECTION INTRAVENOUS
Status: COMPLETED | OUTPATIENT
Start: 2024-10-10 | End: 2024-10-10

## 2024-10-10 RX ADMIN — GADOBUTROL 5 ML: 604.72 INJECTION INTRAVENOUS at 09:52

## 2024-10-16 ENCOUNTER — TELEPHONE (OUTPATIENT)
Age: 39
End: 2024-10-16

## 2024-10-16 NOTE — TELEPHONE ENCOUNTER
Pt. Called back, reviewed MRI results, pt. Did see a GI provider at [facility] yesterday and the MRI results were sent over to them for review, she does have some testing ordered by [facility] and edel like to call us back after she speaks with [facility] to see what they suggest, thank you

## 2024-10-16 NOTE — RESULT ENCOUNTER NOTE
I tried calling the patient to discuss MRI results, there was no answer, I left message encouraging her to call back or let us know when would be a good time to reach out to her to discuss her MRI and answer any questions she may have.    There is a questionable area of possible jejunal wall thickening, with no other obvious signs of inflammation.  The finding is subtle and may not necessarily turn out to be ultimately significant, however she previously had CT imaging findings suggestive of jejunal wall thickening as well, and we have been trying to work her up for potential enteral Crohn's given the longstanding nature of her symptoms.      It looks like she may be trying to transfer her care to GI at [facility]; if that is the case, we should make sure they get this MRI report as part of her records.  If she follows up with us, we may want to consider discussing with advanced endoscopy about potential capsule study or balloon enteroscopy anterograde vs retrograde depending on the apparent location in the small bowel/accessibility of the region in question.  Otherwise if she follows up at [place] then she can discuss these options with her new GI service.    Let us know if she contacts our office and wants to discuss, thank you.

## 2024-10-22 ENCOUNTER — TELEPHONE (OUTPATIENT)
Dept: GASTROENTEROLOGY | Facility: CLINIC | Age: 39
End: 2024-10-22

## 2024-10-22 NOTE — TELEPHONE ENCOUNTER
Repeat EGD pending after review of SIBO testing and lab work recall. Dr. Spear. Pt has appt on 11/1/24.

## 2024-10-23 ENCOUNTER — HOSPITAL ENCOUNTER (OUTPATIENT)
Dept: ULTRASOUND IMAGING | Facility: HOSPITAL | Age: 39
Discharge: HOME | End: 2024-10-23
Payer: MEDICARE

## 2024-10-23 DIAGNOSIS — R10.11 RIGHT UPPER QUADRANT PAIN: ICD-10-CM

## 2024-10-23 DIAGNOSIS — K63.8219 SMALL INTESTINAL BACTERIAL OVERGROWTH, UNSPECIFIED: ICD-10-CM

## 2024-10-23 DIAGNOSIS — K59.00 CONSTIPATION, UNSPECIFIED: ICD-10-CM

## 2024-10-23 DIAGNOSIS — R10.13 EPIGASTRIC PAIN: ICD-10-CM

## 2024-10-23 DIAGNOSIS — R14.0 ABDOMINAL DISTENSION (GASEOUS): ICD-10-CM

## 2024-10-23 DIAGNOSIS — R19.8 OTHER SPECIFIED SYMPTOMS AND SIGNS INVOLVING THE DIGESTIVE SYSTEM AND ABDOMEN: ICD-10-CM

## 2024-10-23 PROCEDURE — 76700 US EXAM ABDOM COMPLETE: CPT

## 2024-10-30 ENCOUNTER — APPOINTMENT (OUTPATIENT)
Dept: LAB | Facility: AMBULARY SURGERY CENTER | Age: 39
End: 2024-10-30

## 2024-11-01 ENCOUNTER — OFFICE VISIT (OUTPATIENT)
Dept: GASTROENTEROLOGY | Facility: AMBULARY SURGERY CENTER | Age: 39
End: 2024-11-01
Payer: MEDICARE

## 2024-11-01 ENCOUNTER — APPOINTMENT (OUTPATIENT)
Dept: LAB | Facility: AMBULARY SURGERY CENTER | Age: 39
End: 2024-11-01
Payer: MEDICARE

## 2024-11-01 VITALS
OXYGEN SATURATION: 99 % | SYSTOLIC BLOOD PRESSURE: 110 MMHG | HEART RATE: 73 BPM | DIASTOLIC BLOOD PRESSURE: 68 MMHG | WEIGHT: 129.6 LBS | HEIGHT: 63 IN | BODY MASS INDEX: 22.96 KG/M2

## 2024-11-01 DIAGNOSIS — R14.0 ABDOMINAL DISTENSION (GASEOUS): ICD-10-CM

## 2024-11-01 DIAGNOSIS — R10.13 EPIGASTRIC PAIN: ICD-10-CM

## 2024-11-01 DIAGNOSIS — R10.11 RIGHT UPPER QUADRANT ABDOMINAL PAIN: ICD-10-CM

## 2024-11-01 DIAGNOSIS — K63.8219 SMALL INTESTINAL BACTERIAL OVERGROWTH, UNSPECIFIED: ICD-10-CM

## 2024-11-01 DIAGNOSIS — K59.00 CONSTIPATION, UNSPECIFIED: ICD-10-CM

## 2024-11-01 DIAGNOSIS — K63.8219 SMALL INTESTINAL BACTERIAL OVERGROWTH (SIBO): Primary | ICD-10-CM

## 2024-11-01 DIAGNOSIS — R93.5 ABNORMAL MRI OF ABDOMEN: ICD-10-CM

## 2024-11-01 DIAGNOSIS — R10.11 RIGHT UPPER QUADRANT PAIN: ICD-10-CM

## 2024-11-01 DIAGNOSIS — R19.8 OTHER SPECIFIED SYMPTOMS AND SIGNS INVOLVING THE DIGESTIVE SYSTEM AND ABDOMEN: ICD-10-CM

## 2024-11-01 PROCEDURE — 82653 EL-1 FECAL QUANTITATIVE: CPT

## 2024-11-01 PROCEDURE — 99214 OFFICE O/P EST MOD 30 MIN: CPT | Performed by: PHYSICIAN ASSISTANT

## 2024-11-01 NOTE — LETTER
2024     Poly Radford, FAN  [address] [facility] [address]    Patient: Angie Amaya   YOB: 1985   Date of Visit: 2024       Dear Dr. Radford:    Thank you for referring Angie Amaya to me for evaluation. Below are my notes for this consultation.    If you have questions, please do not hesitate to call me. I look forward to following your patient along with you.         Sincerely,        Nirav Thompson PA-C        CC: No Recipients    Nirav Thompson PA-C  2024  1:55 PM  Incomplete  Ambulatory Visit  Name: Angie Amaya      : 1985      MRN: [id number]  Encounter Provider: Nirav Thompson PA-C  Encounter Date: 2024   Encounter department: [facility] GASTROENTEROLOGY SPECIALISTS [place]    Assessment & Plan  Abnormal MRI of abdomen  -Jejunal wall thickening seen both on recent CT and MRI enterography  -Due to patient's change in bowels and abnormal imaging study we will plan antegrade balloon enteroscopy  -She requests to see one of our attendings at the follow-up and I will have her see Dr. Roger  Orders:  •  EGD with Single Balloon Enteroscopy; Future    Right upper quadrant abdominal pain  -Most recent ultrasound suggests tumefactive sludge versus gallbladder polyp  -Pain not consistent with biliary colic       Small intestinal bacterial overgrowth (SIBO)  -Failed 2 rounds of Xifaxan and 1 round of metronidazole  -Currently following with TJ Borden who is going to repeat the study.  -Has also tried FODMAP diet         History of Present Illness    Angie Amaya is a 38 y.o. adult with past medical history of small test no bacterial overgrowth, family history of colon polyps who returns for continued changes in bowels toward loose and frequent stool.  This does not happen every day but several times per week.  Denies currently any black or bloody stool.  EGD and colonoscopy done in May 2024 with  EGD showing small sliding hiatal hernia and otherwise normal.  Colonoscopy with 1 flat adenomatous appearing polyp in the rectum 5 mm in size removed and random biopsies done.  Incidental internal hemorrhoids and sigmoid diverticulosis noted.  Duodenal biopsy had some increased intraepithelial lymphocytes with mild villous atrophy.  Celiac gene markers were negative and antibodies negative.  Gastric biopsy was negative for H. pylori.  Rectal polyp was hyperplastic and random colon biopsy negative for microscopic colitis.  She had previously seen another GI group in the area without resolution of her symptoms or explanation of their causes.  She is also now followed up with gastroenterology from [facility].  They are planning to repeat the bacterial intestinal overgrowth study.    Her most bothersome symptoms are the recurrent diarrhea and pain that she gets from time to time.  She had been tested positive for small intestinal bacterial overgrowth but failed to have any symptom relief after 2 rounds of Xifaxan and 1 round of metronidazole.  She is also been tried on FODMAP diet.      Review of Systems   Constitutional:  Negative for activity change, appetite change, chills, diaphoresis, fatigue and unexpected weight change.   HENT:  Negative for mouth sores, rhinorrhea, sore throat and trouble swallowing.    Eyes:  Negative for discharge, redness and visual disturbance.   Respiratory:  Negative for apnea, cough, choking, chest tightness and shortness of breath.    Cardiovascular:  Negative for chest pain, palpitations and leg swelling.   Gastrointestinal:  Positive for abdominal distention, abdominal pain and diarrhea. Negative for anal bleeding, blood in stool, constipation, nausea, rectal pain and vomiting.   Genitourinary:  Negative for difficulty urinating, dysuria, frequency and hematuria.   Musculoskeletal:  Negative for arthralgias, back pain, gait problem, joint swelling and myalgias.   Skin:   "Negative for color change, pallor and rash.   Allergic/Immunologic: Negative for environmental allergies and food allergies.   Neurological:  Negative for dizziness, tremors, weakness, light-headedness, numbness and headaches.   Psychiatric/Behavioral:  Negative for agitation, behavioral problems, confusion and sleep disturbance. The patient is not nervous/anxious.            Objective    /68 (BP Location: Left arm, Patient Position: Sitting, Cuff Size: Standard)   Pulse 73   Ht 5' 3\" (1.6 m)   Wt 58.8 kg (129 lb 9.6 oz)   SpO2 99%   BMI 22.96 kg/m²     Physical Exam  Vitals reviewed.   Constitutional:       General: She is in acute distress (Mild).      Appearance: Normal appearance. She is not ill-appearing.   HENT:      Head: Normocephalic and atraumatic.   Eyes:      General: No scleral icterus.     Conjunctiva/sclera: Conjunctivae normal.   Cardiovascular:      Rate and Rhythm: Normal rate and regular rhythm.   Pulmonary:      Effort: Pulmonary effort is normal. No respiratory distress.      Breath sounds: Normal breath sounds.   Abdominal:      General: Bowel sounds are normal. There is no distension.      Palpations: Abdomen is soft.      Tenderness: There is no abdominal tenderness. There is no guarding.   Musculoskeletal:      Comments: Palpable and slightly flipped xiphoid process   Skin:     General: Skin is warm and dry.   Neurological:      Mental Status: She is alert and oriented to person, place, and time.   Psychiatric:         Mood and Affect: Mood normal.         Behavior: Behavior normal.           Nirav Thompson PA-C  [facility] - Gastroentrology      Nirav Thompson PA-C  2024  1:51 PM  Sign when Signing Visit  Ambulatory Visit  Name: Angie Amaya      : 1985      MRN: [id number]  Encounter Provider: Nirav Thompson PA-C  Encounter Date: 2024   Encounter department: [facility] GASTROENTEROLOG[facility] SPECIALISTS " "[place]    Assessment & Plan  Abnormal MRI of abdomen  -Jejunal wall thickening seen both on recent CT and MRI enterography  -Due to patient's change in bowels and abnormal imaging study we will plan antegrade balloon enteroscopy  -She requests to see one of our attendings at the follow-up and I will have her see Dr. Roger       Right upper quadrant abdominal pain  -Most recent ultrasound suggests tumefactive sludge versus gallbladder polyp  -Pain not consistent with biliary colic       Small intestinal bacterial overgrowth (SIBO)  -Failed 2 rounds of Xifaxan and 1 round of metronidazole  -Currently following with TJ Borden who is going to repeat the study.  -Has also tried FODMAP diet         History of Present Illness    Angie Amaya is a 38 y.o. adult with past medical history of small test no bacterial overgrowth, family history of colon polyps who returns for continued changes in bowels toward loose and frequent stool.  This does not happen every day but several times per week.  Denies currently any black or bloody stool.  EGD and colonoscopy done in May 2024 with EGD showing small sliding hiatal hernia and otherwise normal.  Colonoscopy with 1 flat adenomatous appearing polyp in the rectum 5 mm in size removed and random biopsies done.  Incidental internal hemorrhoids and sigmoid diverticulosis noted.  Duodenal biopsy had some increased intraepithelial lymphocytes with mild villous atrophy.  Celiac gene markers were negative and antibodies negative.  Gastric biopsy was negative for H. pylori.      Review of Systems        Objective    /68 (BP Location: Left arm, Patient Position: Sitting, Cuff Size: Standard)   Pulse 73   Ht 5' 3\" (1.6 m)   Wt 58.8 kg (129 lb 9.6 oz)   SpO2 99%   BMI 22.96 kg/m²     Physical Exam    "

## 2024-11-01 NOTE — ASSESSMENT & PLAN NOTE
-Failed 2 rounds of Xifaxan and 1 round of metronidazole  -Currently following with TJ Borden who is going to repeat the study.  -Has also tried FODMAP diet

## 2024-11-01 NOTE — PROGRESS NOTES
Ambulatory Visit  Name: Angie Amaya      : 1985      MRN: [id number]  Encounter Provider: Nirav Thompson PA-C  Encounter Date: 2024   Encounter department: [facility] GASTROENTEROLOGY SPECIALISTS [place]    Assessment & Plan  Abnormal MRI of abdomen  -Jejunal wall thickening seen both on recent CT and MRI enterography  -Due to patient's change in bowels and abnormal imaging study we will plan antegrade balloon enteroscopy  -She requests to see one of our attendings at the follow-up and I will have her see Dr. Roger  Orders:    EGD with Single Balloon Enteroscopy; Future    Right upper quadrant abdominal pain  -Most recent ultrasound suggests tumefactive sludge versus gallbladder polyp  -Pain not consistent with biliary colic       Small intestinal bacterial overgrowth (SIBO)  -Failed 2 rounds of Xifaxan and 1 round of metronidazole  -Currently following with TJ Borden who is going to repeat the study.  -Has also tried FODMAP diet         History of Present Illness     Angie Amaya is a 38 y.o. adult with past medical history of small test no bacterial overgrowth, family history of colon polyps who returns for continued changes in bowels toward loose and frequent stool.  This does not happen every day but several times per week.  Denies currently any black or bloody stool.  EGD and colonoscopy done in May 2024 with EGD showing small sliding hiatal hernia and otherwise normal.  Colonoscopy with 1 flat adenomatous appearing polyp in the rectum 5 mm in size removed and random biopsies done.  Incidental internal hemorrhoids and sigmoid diverticulosis noted.  Duodenal biopsy had some increased intraepithelial lymphocytes with mild villous atrophy.  Celiac gene markers were negative and antibodies negative.  Gastric biopsy was negative for H. pylori.  Rectal polyp was hyperplastic and random colon biopsy negative for microscopic colitis.  She had previously seen another GI group in the area  "without resolution of her symptoms or explanation of their causes.  She is also now followed up with gastroenterology from [facility].  They are planning to repeat the bacterial intestinal overgrowth study.    Her most bothersome symptoms are the recurrent diarrhea and pain that she gets from time to time.  She had been tested positive for small intestinal bacterial overgrowth but failed to have any symptom relief after 2 rounds of Xifaxan and 1 round of metronidazole.  She is also been tried on FODMAP diet.      Review of Systems   Constitutional:  Negative for activity change, appetite change, chills, diaphoresis, fatigue and unexpected weight change.   HENT:  Negative for mouth sores, rhinorrhea, sore throat and trouble swallowing.    Eyes:  Negative for discharge, redness and visual disturbance.   Respiratory:  Negative for apnea, cough, choking, chest tightness and shortness of breath.    Cardiovascular:  Negative for chest pain, palpitations and leg swelling.   Gastrointestinal:  Positive for abdominal distention, abdominal pain and diarrhea. Negative for anal bleeding, blood in stool, constipation, nausea, rectal pain and vomiting.   Genitourinary:  Negative for difficulty urinating, dysuria, frequency and hematuria.   Musculoskeletal:  Negative for arthralgias, back pain, gait problem, joint swelling and myalgias.   Skin:  Negative for color change, pallor and rash.   Allergic/Immunologic: Negative for environmental allergies and food allergies.   Neurological:  Negative for dizziness, tremors, weakness, light-headedness, numbness and headaches.   Psychiatric/Behavioral:  Negative for agitation, behavioral problems, confusion and sleep disturbance. The patient is not nervous/anxious.            Objective     /68 (BP Location: Left arm, Patient Position: Sitting, Cuff Size: Standard)   Pulse 73   Ht 5' 3\" (1.6 m)   Wt 58.8 kg (129 lb 9.6 oz)   SpO2 99%   BMI 22.96 kg/m²     Physical " Exam  Vitals reviewed.   Constitutional:       General: She is in acute distress (Mild).      Appearance: Normal appearance. She is not ill-appearing.   HENT:      Head: Normocephalic and atraumatic.   Eyes:      General: No scleral icterus.     Conjunctiva/sclera: Conjunctivae normal.   Cardiovascular:      Rate and Rhythm: Normal rate and regular rhythm.   Pulmonary:      Effort: Pulmonary effort is normal. No respiratory distress.      Breath sounds: Normal breath sounds.   Abdominal:      General: Bowel sounds are normal. There is no distension.      Palpations: Abdomen is soft.      Tenderness: There is no abdominal tenderness. There is no guarding.   Musculoskeletal:      Comments: Palpable and slightly flipped xiphoid process   Skin:     General: Skin is warm and dry.   Neurological:      Mental Status: She is alert and oriented to person, place, and time.   Psychiatric:         Mood and Affect: Mood normal.         Behavior: Behavior normal.           Nirav Thompson PA-C  [facility] - Gastroentrology

## 2024-11-03 LAB — ELASTASE PANC STL-MCNT: >800 UG/G

## 2024-11-06 DIAGNOSIS — R14.0 ABDOMINAL DISTENTION: ICD-10-CM

## 2024-11-06 DIAGNOSIS — R10.84 GENERALIZED ABDOMINAL PAIN: Primary | ICD-10-CM

## 2025-02-18 ENCOUNTER — TELEPHONE (OUTPATIENT)
Age: 40
End: 2025-02-18

## 2025-02-18 NOTE — TELEPHONE ENCOUNTER
Went to confirm procedure, noticed pt was established with Dr Spear and has an appt with her in March. Called to confirm who she is receiving care from and take care of her appt desk, no response. Lmom requesting call back

## 2025-02-18 NOTE — TELEPHONE ENCOUNTER
Spoke with pt, she would like to continue care with Dr. Roger. Pt does not remember scheduling a 3/7/25 appointment with Dr. Spear. Cancelled appointment. Pt states that she has her prep instructions, and has confirmed her appointment.

## 2025-02-27 ENCOUNTER — PREP FOR PROCEDURE (OUTPATIENT)
Dept: GASTROENTEROLOGY | Facility: AMBULARY SURGERY CENTER | Age: 40
End: 2025-02-27

## 2025-02-27 DIAGNOSIS — R93.5 ABNORMAL MRI OF ABDOMEN: Primary | ICD-10-CM

## 2025-02-27 DIAGNOSIS — K52.9 ENTERITIS: ICD-10-CM

## 2025-02-27 RX ORDER — SODIUM CHLORIDE, SODIUM LACTATE, POTASSIUM CHLORIDE, CALCIUM CHLORIDE 600; 310; 30; 20 MG/100ML; MG/100ML; MG/100ML; MG/100ML
125 INJECTION, SOLUTION INTRAVENOUS CONTINUOUS
Status: CANCELLED | OUTPATIENT
Start: 2025-02-27

## 2025-02-28 ENCOUNTER — ANESTHESIA (OUTPATIENT)
Dept: GASTROENTEROLOGY | Facility: HOSPITAL | Age: 40
End: 2025-02-28
Payer: MEDICARE

## 2025-02-28 ENCOUNTER — ANESTHESIA EVENT (OUTPATIENT)
Dept: GASTROENTEROLOGY | Facility: HOSPITAL | Age: 40
End: 2025-02-28
Payer: MEDICARE

## 2025-02-28 ENCOUNTER — HOSPITAL ENCOUNTER (OUTPATIENT)
Dept: GASTROENTEROLOGY | Facility: HOSPITAL | Age: 40
Setting detail: OUTPATIENT SURGERY
Discharge: HOME | End: 2025-02-28
Payer: MEDICARE

## 2025-02-28 VITALS
OXYGEN SATURATION: 100 % | RESPIRATION RATE: 16 BRPM | TEMPERATURE: 97 F | HEART RATE: 48 BPM | SYSTOLIC BLOOD PRESSURE: 92 MMHG | DIASTOLIC BLOOD PRESSURE: 50 MMHG

## 2025-02-28 DIAGNOSIS — R63.4 UNINTENTIONAL WEIGHT LOSS: Primary | ICD-10-CM

## 2025-02-28 DIAGNOSIS — R93.5 ABNORMAL MRI OF ABDOMEN: ICD-10-CM

## 2025-02-28 DIAGNOSIS — R10.813 RIGHT LOWER QUADRANT ABDOMINAL TENDERNESS WITHOUT REBOUND TENDERNESS: ICD-10-CM

## 2025-02-28 DIAGNOSIS — K52.9 ENTERITIS: ICD-10-CM

## 2025-02-28 PROCEDURE — 88305 TISSUE EXAM BY PATHOLOGIST: CPT | Performed by: SPECIALIST

## 2025-02-28 PROCEDURE — 44361 SMALL BOWEL ENDOSCOPY/BIOPSY: CPT | Performed by: INTERNAL MEDICINE

## 2025-02-28 RX ORDER — FENTANYL CITRATE 50 UG/ML
INJECTION, SOLUTION INTRAMUSCULAR; INTRAVENOUS AS NEEDED
Status: DISCONTINUED | OUTPATIENT
Start: 2025-02-28 | End: 2025-02-28

## 2025-02-28 RX ORDER — GLYCOPYRROLATE 0.2 MG/ML
INJECTION INTRAMUSCULAR; INTRAVENOUS AS NEEDED
Status: DISCONTINUED | OUTPATIENT
Start: 2025-02-28 | End: 2025-02-28

## 2025-02-28 RX ORDER — SODIUM CHLORIDE, SODIUM LACTATE, POTASSIUM CHLORIDE, CALCIUM CHLORIDE 600; 310; 30; 20 MG/100ML; MG/100ML; MG/100ML; MG/100ML
INJECTION, SOLUTION INTRAVENOUS CONTINUOUS PRN
Status: DISCONTINUED | OUTPATIENT
Start: 2025-02-28 | End: 2025-02-28

## 2025-02-28 RX ORDER — DICYCLOMINE HYDROCHLORIDE 10 MG/1
10 CAPSULE ORAL
Qty: 90 CAPSULE | Refills: 3 | Status: SHIPPED | OUTPATIENT
Start: 2025-02-28

## 2025-02-28 RX ORDER — LIDOCAINE HYDROCHLORIDE 10 MG/ML
INJECTION, SOLUTION EPIDURAL; INFILTRATION; INTRACAUDAL; PERINEURAL AS NEEDED
Status: DISCONTINUED | OUTPATIENT
Start: 2025-02-28 | End: 2025-02-28

## 2025-02-28 RX ORDER — SODIUM CHLORIDE, SODIUM LACTATE, POTASSIUM CHLORIDE, CALCIUM CHLORIDE 600; 310; 30; 20 MG/100ML; MG/100ML; MG/100ML; MG/100ML
125 INJECTION, SOLUTION INTRAVENOUS CONTINUOUS
Status: DISCONTINUED | OUTPATIENT
Start: 2025-02-28 | End: 2025-03-04 | Stop reason: HOSPADM

## 2025-02-28 RX ORDER — PROPOFOL 10 MG/ML
INJECTION, EMULSION INTRAVENOUS AS NEEDED
Status: DISCONTINUED | OUTPATIENT
Start: 2025-02-28 | End: 2025-02-28

## 2025-02-28 RX ADMIN — PROPOFOL 100 MG: 10 INJECTION, EMULSION INTRAVENOUS at 09:46

## 2025-02-28 RX ADMIN — FENTANYL CITRATE 50 MCG: 50 INJECTION INTRAMUSCULAR; INTRAVENOUS at 09:42

## 2025-02-28 RX ADMIN — PROPOFOL 50 MG: 10 INJECTION, EMULSION INTRAVENOUS at 09:48

## 2025-02-28 RX ADMIN — PROPOFOL 50 MG: 10 INJECTION, EMULSION INTRAVENOUS at 09:52

## 2025-02-28 RX ADMIN — SODIUM CHLORIDE, SODIUM LACTATE, POTASSIUM CHLORIDE, AND CALCIUM CHLORIDE: .6; .31; .03; .02 INJECTION, SOLUTION INTRAVENOUS at 08:09

## 2025-02-28 RX ADMIN — GLYCOPYRROLATE 0.1 MG: 0.2 INJECTION INTRAMUSCULAR; INTRAVENOUS at 09:42

## 2025-02-28 RX ADMIN — LIDOCAINE HYDROCHLORIDE 50 MG: 10 INJECTION, SOLUTION EPIDURAL; INFILTRATION; INTRACAUDAL at 09:46

## 2025-02-28 NOTE — ANESTHESIA PREPROCEDURE EVALUATION
Procedure:  EGD WITH PUSH ENTEROSCOPY    Relevant Problems   ANESTHESIA (within normal limits)      FEN/Gastrointestinal   (+) IBS (irritable bowel syndrome)      Other   (+) Dyspepsia        Physical Exam    Airway    Mallampati score: II  TM Distance: >3 FB  Neck ROM: full     Dental   No notable dental hx     Cardiovascular      Pulmonary      Other Findings  post-pubertal.      Anesthesia Plan  ASA Score- 2     Anesthesia Type- IV sedation with anesthesia with ASA Monitors.         Additional Monitors:     Airway Plan:            Plan Factors-Exercise tolerance (METS): >4 METS.    Chart reviewed.   Existing labs reviewed. Patient summary reviewed.    Patient is not a current smoker.              Induction- intravenous.    Postoperative Plan-         Informed Consent- Anesthetic plan and risks discussed with patient.  I personally reviewed this patient with the CRNA. Discussed and agreed on the Anesthesia Plan with the CRNA..      NPO Status:  Vitals Value Taken Time   Date of last liquid 02/28/25 02/28/25 0825   Time of last liquid 0001 02/28/25 0825   Date of last solid 02/28/25 02/28/25 0825   Time of last solid 0001 02/28/25 0825

## 2025-02-28 NOTE — ANESTHESIA POSTPROCEDURE EVALUATION
Post-Op Assessment Note    CV Status:  Stable  Pain Score: 0    Pain management: adequate       Mental Status:  Alert and awake   Hydration Status:  Euvolemic   PONV Controlled:  Controlled   Airway Patency:  Patent     Post Op Vitals Reviewed: Yes    No anethesia notable event occurred.    Staff: CRNA           Last Filed PACU Vitals:  Vitals Value Taken Time   Temp     Pulse 61    BP 90/54    Resp 18    SpO2 99

## 2025-02-28 NOTE — H&P
History and Physical -  Gastroenterology Specialists  Angie Amaya 39 y.o. female MRN: [id number]    HPI: Angie Amaya is a 39 y.o. year old female who presents with abnormal MRI      Review of Systems    Historical Information   Past Medical History:   Diagnosis Date    Colon polyp     IBS (irritable bowel syndrome)      Past Surgical History:   Procedure Laterality Date    BREAST SURGERY      reduction    SHOULDER ARTHROSCOPY      TUBAL LIGATION      TYMPANOSTOMY TUBE PLACEMENT       Social History   Social History     Substance and Sexual Activity   Alcohol Use Not Currently    Comment: less than 1 per month     Social History     Substance and Sexual Activity   Drug Use Never     Social History     Tobacco Use   Smoking Status Never   Smokeless Tobacco Never     Family History   Problem Relation Age of Onset    Colon polyps Father        Meds/Allergies     Not in a hospital admission.    Allergies   Allergen Reactions    Pollen Extract Allergic Rhinitis       Objective     BP 97/57   Pulse 71   Temp 97.7 °F (36.5 °C) (Temporal)   Resp 17   SpO2 100%       PHYSICAL EXAM    Gen: NAD  CV: RRR  CHEST: Clear  ABD: soft, NT/ND  EXT: no edema  Neuro: AAO      ASSESSMENT/PLAN:  This is a 39 y.o. year old female here for  abnormal MRI    PLAN:   Procedure: push enteroscopy

## 2025-03-04 PROCEDURE — 88305 TISSUE EXAM BY PATHOLOGIST: CPT | Performed by: SPECIALIST

## 2025-03-05 ENCOUNTER — RESULTS FOLLOW-UP (OUTPATIENT)
Dept: GASTROENTEROLOGY | Facility: AMBULARY SURGERY CENTER | Age: 40
End: 2025-03-05

## 2025-06-10 ENCOUNTER — HOSPITAL ENCOUNTER (OUTPATIENT)
Dept: RADIOLOGY | Facility: HOSPITAL | Age: 40
Discharge: HOME | End: 2025-06-10
Payer: MEDICARE

## 2025-06-10 ENCOUNTER — RESULTS FOLLOW-UP (OUTPATIENT)
Dept: OTHER | Facility: HOSPITAL | Age: 40
End: 2025-06-10

## 2025-06-10 ENCOUNTER — OFFICE VISIT (OUTPATIENT)
Dept: GASTROENTEROLOGY | Facility: AMBULARY SURGERY CENTER | Age: 40
End: 2025-06-10
Payer: MEDICARE

## 2025-06-10 VITALS
HEIGHT: 63 IN | OXYGEN SATURATION: 100 % | SYSTOLIC BLOOD PRESSURE: 110 MMHG | DIASTOLIC BLOOD PRESSURE: 70 MMHG | HEART RATE: 69 BPM | WEIGHT: 132.6 LBS | BODY MASS INDEX: 23.5 KG/M2

## 2025-06-10 DIAGNOSIS — R14.0 ABDOMINAL DISTENTION: ICD-10-CM

## 2025-06-10 DIAGNOSIS — R10.813 RIGHT LOWER QUADRANT ABDOMINAL TENDERNESS WITHOUT REBOUND TENDERNESS: ICD-10-CM

## 2025-06-10 DIAGNOSIS — K58.2 IRRITABLE BOWEL SYNDROME WITH BOTH CONSTIPATION AND DIARRHEA: Primary | ICD-10-CM

## 2025-06-10 DIAGNOSIS — R10.84 GENERALIZED ABDOMINAL PAIN: ICD-10-CM

## 2025-06-10 DIAGNOSIS — K63.8219 SMALL INTESTINAL BACTERIAL OVERGROWTH (SIBO): ICD-10-CM

## 2025-06-10 PROCEDURE — 99214 OFFICE O/P EST MOD 30 MIN: CPT | Performed by: INTERNAL MEDICINE

## 2025-06-10 PROCEDURE — 74018 RADEX ABDOMEN 1 VIEW: CPT

## 2025-06-10 RX ORDER — DICYCLOMINE HYDROCHLORIDE 10 MG/1
10 CAPSULE ORAL
Qty: 270 CAPSULE | Refills: 3 | Status: SHIPPED | OUTPATIENT
Start: 2025-06-10

## 2025-06-10 NOTE — ASSESSMENT & PLAN NOTE
-Awaiting results of KUB, pending results of KUB may recommend more aggressive bowel regimen, she did not do very well with MiraLAX and Metamucil

## 2025-06-10 NOTE — PROGRESS NOTES
Name: Angie Amaya      : 1985      MRN: [id number]  Encounter Provider: Kehinde Roger MD  Encounter Date: 6/10/2025   Encounter department: [facility]OENTEROLOG[facility] [place]  :  Had an extensive discussion with the patient.  Reviewed all her prior studies biopsies imaging studies.  Prior GI recommendations.  Swelling suspect that she has underlying IBS or visceral hypersensitivity, has a component of xiphoid process syndrome.  Alternates between diarrhea and constipation.  Addressed this with the patient, try to reassure her.  Advised her that she has not had any significant pathology despite having nearly 20 years of symptoms and has not had any change or untoward effect from her symptoms.    She also responds very well to dicyclomine which is very reassuring.  Assessment & Plan  Right lower quadrant abdominal tenderness without rebound tenderness  -Discussed with her that most of her symptoms as noted above most likely secondary to bowel versus visceral hypersensitivity  - Recommend checking a KUB which had previously been ordered  - Sympathized with her, I understand her frustration with her need for diagnosis we tried to reassure her that this is most likely IBS  Orders:    dicyclomine (BENTYL) 10 mg capsule; Take 1 capsule (10 mg total) by mouth 3 (three) times a day before meals    Irritable bowel syndrome with both constipation and diarrhea  -Awaiting results of KUB, pending results of KUB may recommend more aggressive bowel regimen, she did not do very well with MiraLAX and Metamucil       Small intestinal bacterial overgrowth (SIBO)  -Has been treated and cleared in the past, could consider repeat testing for SIBO       Patient's imaging studies including most recent enterography.  Biopsies, endoscopic reports, prior consultative reports were all reviewed.  History was independent obtained.    History of Present Illness   HPI  Angie Amaya is a 39 y.o. female who presents for  "follow-up.  Patient was recently seen for a push enteroscopy due to possible thickening on enterography.  Endoscopic valuation and biopsies were normal.  Patient has a longstanding history of symptoms, typified by intense bouts of abdominal pain which can typically last several days but can actually last up to a month at a time.  She has had extensive evaluation in the past.  Multiple endoscopies, has been found to have breath testing positive for SIBO, treated follow-up breath testing was negative.  Stool studies have been unremarkable.  Biopsies done prior to his endoscopic evaluations have been normal.  Studies were normal other than as noted above recent enterography.    Since the push enterography, she has been taking dicyclomine prescribed which she states does significantly help but she still feels like there is some underlying sensation.    She has very irregular bowel movements, alternating hard stools, loose watery stools.  She has been told that she has IBS in the past but patient notes that she was not quite satisfied with that as a possible diagnosis.    Additionally she suspected some of her symptoms may have started after discontinuing fluoxetine and reducing gabapentin dosing.    She notes that occasionally alcohol will help to subside her symptoms.    She notes that taking combination of Metamucil and MiraLAX actually cause more pain some bloating as well.    She frequently feels a pressure or lump in her rectum post bowel movement which made her concerned for possible obstruction.    She also reports prominence of her xiphoid process when she has abdominal bloating and discomfort with some tenderness associated with this.  Most of her pain is in her lower abdomen.      Review of Systems  Review of systems was negative except for pertinent positive mentioned in HPI         Objective   /70 (BP Location: Right arm, Patient Position: Sitting, Cuff Size: Standard)   Pulse 69   Ht 5' 3\" (1.6 m)  "  Wt 60.1 kg (132 lb 9.6 oz)   SpO2 100%   BMI 23.49 kg/m²      Physical Exam  GEN: WN/WD, no acute distress  HEENT: anicteric, MMM, no cervical lymphadenopathy  CV: RRR, no m/r/g  CHEST: CTA b/l, no WRR, tenderness at her xiphoid  ABD: +BS, soft NT/ND, no hepatosplenomegaly  EXT: no C/C/E  NEURO: AAOx3  SKIN: no rashes

## 2025-06-10 NOTE — ASSESSMENT & PLAN NOTE
-Discussed with her that most of her symptoms as noted above most likely secondary to bowel versus visceral hypersensitivity  - Recommend checking a KUB which had previously been ordered  - Sympathized with her, I understand her frustration with her need for diagnosis we tried to reassure her that this is most likely IBS  Orders:    dicyclomine (BENTYL) 10 mg capsule; Take 1 capsule (10 mg total) by mouth 3 (three) times a day before meals